# Patient Record
Sex: FEMALE | Race: WHITE | NOT HISPANIC OR LATINO | Employment: OTHER | URBAN - METROPOLITAN AREA
[De-identification: names, ages, dates, MRNs, and addresses within clinical notes are randomized per-mention and may not be internally consistent; named-entity substitution may affect disease eponyms.]

---

## 2017-02-07 ENCOUNTER — OFFICE VISIT - HEALTHEAST (OUTPATIENT)
Dept: INTERNAL MEDICINE | Facility: CLINIC | Age: 27
End: 2017-02-07

## 2017-02-07 DIAGNOSIS — M79.676 PAIN OF FIFTH TOE: ICD-10-CM

## 2017-02-07 ASSESSMENT — MIFFLIN-ST. JEOR: SCORE: 1791.37

## 2017-05-14 ENCOUNTER — TRANSFERRED RECORDS (OUTPATIENT)
Dept: HEALTH INFORMATION MANAGEMENT | Facility: CLINIC | Age: 27
End: 2017-05-14

## 2017-05-15 ENCOUNTER — OFFICE VISIT (OUTPATIENT)
Dept: PODIATRY | Facility: CLINIC | Age: 27
End: 2017-05-15
Payer: COMMERCIAL

## 2017-05-15 DIAGNOSIS — S92.355A CLOSED NONDISPLACED FRACTURE OF FIFTH METATARSAL BONE OF LEFT FOOT, INITIAL ENCOUNTER: Primary | ICD-10-CM

## 2017-05-15 PROCEDURE — 99204 OFFICE O/P NEW MOD 45 MIN: CPT | Performed by: PODIATRIST

## 2017-05-15 NOTE — MR AVS SNAPSHOT
After Visit Summary   5/15/2017    Raquel Van    MRN: [id number]           Patient Information     Date Of Birth          1990        Visit Information        Provider Department      5/15/2017 1:40 PM Carson Peralta DPM [facility]        Today's Diagnoses     Closed nondisplaced fracture of fifth metatarsal bone of left foot, initial encounter    -  1      Care Instructions    Minimal weight bearing as tolerated in the boot with crutches as needed.    PRICE Therapy    Many aches and pains throughout the foot and ankle can be helped with many simple treatments.  This is usually described as PRICE Therapy.      P - Protection - often times, inflammation/pain in the lower extremity is not able to improve simply because the areas involved are never allowed to rest.  Every step we take can bother the problematic area.  Protecting those areas is an important step in the healing process.  This may involve a walking cast boot, a special insert/orthotic device, an ankle brace, or simply avoiding barefoot walking.    R - Rest - in addition to protecting the foot/ankle, resting is an important, but often times difficult, treatment option.  Getting off your feet when they bother you, and specifically avoiding activities that cause pain/discomfort, are very beneficial to prevent, and treat, foot/ankle pain.      I - Ice - icing regularly can help to decrease inflammation and swelling in the foot, thus decreasing pain.  Using an ice pack or a bag of frozen peas works very well.  Ice for 20 minutes multiple times per day as needed.  Do not place the ice directly on the skin as this can cause tissue damage.    C - Compression - using a compression wrap or an ACE wrap can help to decrease swelling, which can help to decrease pain.  Wearing the wraps is generally not needed at night, but they should be worn on a regular basis when you are going to be on your feet for prolonged periods as  gravity tends to pull fluids down to your feet/ankles.    E - Elevation - elevating your lower extremities multiple times daily for 15-20 minutes can help to decrease swelling, which works well in decreasing pain levels.      NSAID/Tylenol - An anti-inflammatory, like Aleve or ibuprofen, and/or a pain medication, such as Tylenol, can help to improve pain levels and get the issue resolved sooner rather than later.  Anyone with liver issues should be careful with Tylenol, and anyone with high blood pressure or heart, stomach or kidney issues should be careful with anti-inflammatories.  Please ask if you have questions about these medications, including dosage.      BLOOD CLOT PREVENTION - WEARING A CAST BOOT    Any brace that extends up to the knee can increase your chance of developing a blood clot. Blood clots generally occur in the large veins of your calf, thigh or abdomen. A blood clot may form when blood is stagnant in the veins while your leg is immobilized in the brace. Ernie and relaxing the calf muscles by moving the ankle is the best method to avoid stagnant blood. Clarify with your doctor if you should be doing this as this may not be recommended for certain tendon surgeries.    Blood clots or deep venous thrombosis (DVT) can be life threatening if a portion of the clot breaks away and travels through the veins to your lungs. This is called a pulmonary embolism (PE) which can result in death.    Symptoms of a DVT may include swelling, tenderness, a warm feeling or redness in the leg.  DVT can occur in both legs, even if only one side is in a brace. If you have these symptoms, you should call your doctor immediately or go to the Emergency Room to have your leg scanned.     Symptoms of a pulmonary embolism (PE) include chest pain, shortness of breath or the need to breath rapidly that is not associated with exercise, difficulty breathing, rapid heart rate, coughing or coughing up blood, or a feeling of  passing out. Chest pain can range from mild to knife-like pain while taking a deep breath. Pulmonary embolism can occur without any symptoms whatsoever. You need to be evaluated in a hospital emergency room immediately if you have symptoms of a PE. Please call 911 as PE is a life-threatening emergency.    Be certain that you understand how much ankle range of motion is allowed. Your foot and ankle problem is being immobilized by the brace, yet we do not want you to develop a blood clot. The velcro strap braces are intended to be removed for periodic exercise of the ankle. Your doctor will tell you if it is okay to do this depending on the type of surgery you had.    Your own personal risk of developing a DVT or PE is dependent on many factors. A personal or family history of previous blood clot or a clotting disorder (such as thrombophilia) puts you at risk. Other risk factors include history of cancer, current use of estrogen medications (such as birth control pills or post menopausal medications), recent trauma or fracture, diabetes, recent heart attack, COPD, heart failure, pregnancy, obesity, advanced age, smoking, etc. Please make sure your doctor is aware if you have any of these risk factors.          Follow-ups after your visit        Who to contact     If you have questions or need follow up information about today's clinic visit or your schedule please contact [facility] directly at [phone].  Normal or non-critical lab and imaging results will be communicated to you by MyChart, letter or phone within 4 business days after the clinic has received the results. If you do not hear from us within 7 days, please contact the clinic through MyChart or phone. If you have a critical or abnormal lab result, we will notify you by phone as soon as possible.  Submit refill requests through [employer] or call your pharmacy and they will forward the refill request to us. Please allow 3 business days  "for your refill to be completed.          Additional Information About Your Visit        [email or web address] Information     [facility] lets you send messages to your doctor, view your test results, renew your prescriptions, schedule appointments and more. To sign up, go to [email or web address] . Click on \"Log in\" on the left side of the screen, which will take you to the Welcome page. Then click on \"Sign up Now\" on the right side of the page.     You will be asked to enter the access code listed below, as well as some personal information. Please follow the directions to create your username and password.     Your access code is: [email or web address]  Expires: 2017  2:12 PM     Your access code will  in 90 days. If you need help or a new code, please call your [place] clinic or [phone].        Care EveryWhere ID     This is your Care EveryWhere ID. This could be used by other organizations to access your [place] medical records  [id number]         Blood Pressure from Last 3 Encounters:   No data found for BP    Weight from Last 3 Encounters:   No data found for Wt              Today, you had the following     No orders found for display       Primary Care Provider    None Specified       No primary provider on file.        Thank you!     Thank you for choosing [facility]  for your care. Our goal is always to provide you with excellent care. Hearing back from our patients is one way we can continue to improve our services. Please take a few minutes to complete the written survey that you may receive in the mail after your visit with us. Thank you!             Your Updated Medication List - Protect others around you: Learn how to safely use, store and throw away your medicines at www.disposemymeds.org.          This list is accurate as of: 5/15/17  2:12 PM.  Always use your most recent med list.                   Brand Name Dispense Instructions for use    IBUPROFEN PO      Take 800 mg by mouth " every 4 hours as needed for moderate pain

## 2017-05-15 NOTE — NURSING NOTE
Chief Complaint   Patient presents with     Fracture     L foot fracture DOI:5/13/17       Initial There were no vitals taken for this visit. There is no height or weight on file to calculate BMI.  Medication Reconciliation: elijah Rivas MA May 15, 2017 2:30 PM

## 2017-05-15 NOTE — PROGRESS NOTES
PATIENT HISTORY:  Raquel Van is a 27 year old female who presents to clinic for a left foot injury.  2 days ago pt fell off a front step.  XRs taken in [place] and brought on disc.  5th met base fx noted, tuberosity type.  6-8/10 pain.  She is icing, elevating.  She has a post op shoe and crutches.    Review of Systems:  Patient denies fever, chills, rash, wound, stiffness, limping, numbness, weakness, heart burn, blood in stool, chest pain with activity, calf pain when walking, shortness of breath with activity, chronic cough, easy bleeding/bruising, swelling of ankles, excessive thirst, fatigue, depression, anxiety.  Patient admits to limping.     PAST MEDICAL HISTORY: Denies     PAST SURGICAL HISTORY: No past surgical history on file.     MEDICATIONS: No current outpatient prescriptions on file.     ALLERGIES:  Allergies not on file     SOCIAL HISTORY:   Social History     Social History     Marital status:      Spouse name: N/A     Number of children: N/A     Years of education: N/A     Occupational History     Not on file.     Social History Main Topics     Smoking status: Not on file     Smokeless tobacco: Not on file     Alcohol use Not on file     Drug use: Not on file     Sexual activity: Not on file     Other Topics Concern     Not on file     Social History Narrative        FAMILY HISTORY: DM     EXAM:    General appearance: Patient is alert and fully cooperative with history & exam.  No sign of distress is noted during the visit.     Psychiatric: Affect is pleasant & appropriate.  Patient appears motivated to improve health.     Respiratory: Breathing is regular & unlabored while sitting.     HEENT: Hearing is intact to spoken word.  Speech is clear.  No gross evidence of visual impairment that would impact ambulation.     Dermatologic: Skin is intact to both lower extremities without significant lesions, rash or abrasion.  No paronychia or evidence of soft tissue infection is noted.     Vascular: DP &  PT pulses are intact & regular bilaterally.  No significant edema or varicosities noted.  CFT and skin temperature are normal to both lower extremities.     Neurologic: Lower extremity sensation is intact to light touch.  No evidence of weakness or contracture in the lower extremities.  No evidence of neuropathy.     Musculoskeletal: Pain to palpation at left 5th metatarsal base.  No other significant foot/ankle pain.  No gross ankle deformity noted.  No foot or ankle joint effusion is noted.     XRs on disc reviewed with pt.  Relatively nondisplaced fx of left 5th metatarsal base/tuberosity.      ASSESSMENT: Left 5th metatarsal fx     PLAN:  Reviewed patient's chart in epic.  Discussed condition and treatment options including pros and cons.    Treatment options for the fracture were discussed.  Non-operative treatment would involve a period of immobilization, rest, bracing or casting and edema control.  There is potential for the fracture to heal without surgery yet there may still be a need for delayed surgery.  There is potential for non-union with any fracture.    A decision was made to pursue nonoperative care based on patient preference, fracture pattern, anticipated stability of the fracture, appearance of the soft tissue.    Short Aircast boot fitted.  RICE advised.  Crutch use prn, but pt may transition to wt bearing as tolerated in the boot.    We discussed immobilization and the risk of blood clot. Leg raises, knee bends, compression recommended. Patient to seek medical attention if calf swelling and/or pain, chest pain, shortness of breath.    F/u in 3 wks.         Carson Peralta DPM, FACFAS

## 2017-05-15 NOTE — PATIENT INSTRUCTIONS
Minimal weight bearing as tolerated in the boot with crutches as needed.    PRICE Therapy    Many aches and pains throughout the foot and ankle can be helped with many simple treatments.  This is usually described as PRICE Therapy.      P - Protection - often times, inflammation/pain in the lower extremity is not able to improve simply because the areas involved are never allowed to rest.  Every step we take can bother the problematic area.  Protecting those areas is an important step in the healing process.  This may involve a walking cast boot, a special insert/orthotic device, an ankle brace, or simply avoiding barefoot walking.    R - Rest - in addition to protecting the foot/ankle, resting is an important, but often times difficult, treatment option.  Getting off your feet when they bother you, and specifically avoiding activities that cause pain/discomfort, are very beneficial to prevent, and treat, foot/ankle pain.      I - Ice - icing regularly can help to decrease inflammation and swelling in the foot, thus decreasing pain.  Using an ice pack or a bag of frozen peas works very well.  Ice for 20 minutes multiple times per day as needed.  Do not place the ice directly on the skin as this can cause tissue damage.    C - Compression - using a compression wrap or an ACE wrap can help to decrease swelling, which can help to decrease pain.  Wearing the wraps is generally not needed at night, but they should be worn on a regular basis when you are going to be on your feet for prolonged periods as gravity tends to pull fluids down to your feet/ankles.    E - Elevation - elevating your lower extremities multiple times daily for 15-20 minutes can help to decrease swelling, which works well in decreasing pain levels.      NSAID/Tylenol - An anti-inflammatory, like Aleve or ibuprofen, and/or a pain medication, such as Tylenol, can help to improve pain levels and get the issue resolved sooner rather than later.  Anyone  with liver issues should be careful with Tylenol, and anyone with high blood pressure or heart, stomach or kidney issues should be careful with anti-inflammatories.  Please ask if you have questions about these medications, including dosage.      BLOOD CLOT PREVENTION - WEARING A CAST BOOT    Any brace that extends up to the knee can increase your chance of developing a blood clot. Blood clots generally occur in the large veins of your calf, thigh or abdomen. A blood clot may form when blood is stagnant in the veins while your leg is immobilized in the brace. Ernie and relaxing the calf muscles by moving the ankle is the best method to avoid stagnant blood. Clarify with your doctor if you should be doing this as this may not be recommended for certain tendon surgeries.    Blood clots or deep venous thrombosis (DVT) can be life threatening if a portion of the clot breaks away and travels through the veins to your lungs. This is called a pulmonary embolism (PE) which can result in death.    Symptoms of a DVT may include swelling, tenderness, a warm feeling or redness in the leg.  DVT can occur in both legs, even if only one side is in a brace. If you have these symptoms, you should call your doctor immediately or go to the Emergency Room to have your leg scanned.     Symptoms of a pulmonary embolism (PE) include chest pain, shortness of breath or the need to breath rapidly that is not associated with exercise, difficulty breathing, rapid heart rate, coughing or coughing up blood, or a feeling of passing out. Chest pain can range from mild to knife-like pain while taking a deep breath. Pulmonary embolism can occur without any symptoms whatsoever. You need to be evaluated in a hospital emergency room immediately if you have symptoms of a PE. Please call 911 as PE is a life-threatening emergency.    Be certain that you understand how much ankle range of motion is allowed. Your foot and ankle problem is being  immobilized by the brace, yet we do not want you to develop a blood clot. The velcro strap braces are intended to be removed for periodic exercise of the ankle. Your doctor will tell you if it is okay to do this depending on the type of surgery you had.    Your own personal risk of developing a DVT or PE is dependent on many factors. A personal or family history of previous blood clot or a clotting disorder (such as thrombophilia) puts you at risk. Other risk factors include history of cancer, current use of estrogen medications (such as birth control pills or post menopausal medications), recent trauma or fracture, diabetes, recent heart attack, COPD, heart failure, pregnancy, obesity, advanced age, smoking, etc. Please make sure your doctor is aware if you have any of these risk factors.

## 2017-05-15 NOTE — PROGRESS NOTES
Unable to do weight plan, pt refused to have weight taken.  SPENSER Rivas MA May 15, 2017 2:31 PM

## 2017-06-05 ENCOUNTER — OFFICE VISIT (OUTPATIENT)
Dept: PODIATRY | Facility: CLINIC | Age: 27
End: 2017-06-05
Payer: COMMERCIAL

## 2017-06-05 ENCOUNTER — RADIANT APPOINTMENT (OUTPATIENT)
Dept: GENERAL RADIOLOGY | Facility: CLINIC | Age: 27
End: 2017-06-05
Attending: PODIATRIST
Payer: COMMERCIAL

## 2017-06-05 VITALS
DIASTOLIC BLOOD PRESSURE: 88 MMHG | BODY MASS INDEX: 35.84 KG/M2 | HEIGHT: 66 IN | SYSTOLIC BLOOD PRESSURE: 123 MMHG | WEIGHT: 223 LBS

## 2017-06-05 DIAGNOSIS — S92.355D CLOSED NONDISPLACED FRACTURE OF FIFTH METATARSAL BONE OF LEFT FOOT WITH ROUTINE HEALING, SUBSEQUENT ENCOUNTER: Primary | ICD-10-CM

## 2017-06-05 PROCEDURE — 99213 OFFICE O/P EST LOW 20 MIN: CPT | Performed by: PODIATRIST

## 2017-06-05 PROCEDURE — 73630 X-RAY EXAM OF FOOT: CPT | Mod: LT

## 2017-06-05 NOTE — PROGRESS NOTES
Weight management plan: Patient was referred to their PCP to discuss a diet and exercise plan.     SPENSER Rivas MA June 5, 2017 1:59 PM

## 2017-06-05 NOTE — MR AVS SNAPSHOT
After Visit Summary   6/5/2017    Raquel Van    MRN: [id number]           Patient Information     Date Of Birth          1990        Visit Information        Provider Department      6/5/2017 1:40 PM Carson Peralta DPM [facility]        Today's Diagnoses     Closed nondisplaced fracture of fifth metatarsal bone of left foot with routine healing, subsequent encounter    -  1      Care Instructions    PRICE THERAPY    Many aches and pains throughout the foot and ankle can be helped with many simple treatments. This is usually described as PRICE Therapy.      P - Protection - often times, inflammation/pain in the lower extremity is not able to improve simply because the areas involved are never allowed to rest. Every step we take can bother the problematic area. Protecting those areas is an important step in the healing process. This may involve a walking cast boot, a special insert/orthotic device, an ankle brace, or simply avoiding barefoot walking.    R - Rest - in addition to protecting the foot/ankle, resting is an important, but often times difficult, treatment option. Getting off your feet when they bother you, and specifically avoiding activities that cause pain/discomfort, are very beneficial to prevent, and treat, foot/ankle pain.      I - Ice - icing regularly can help to decrease inflammation and swelling in the foot, thus decreasing pain. Using an ice pack or a bag of frozen veggies works very well. Ice for 20 minutes multiple times per day as needed.  Do not place the ice directly on the skin as this can cause tissue damage.    C - Compression - using a compression wrap or an ACE wrap can help to decrease swelling, which can help to decrease pain. Wearing the wraps is generally not needed at night, but they should be worn on a regular basis when you are going to be on your feet for prolonged periods as gravity tends to pull fluids down to your feet/ankles.    E -  "Elevation - elevating your lower extremities multiple times daily for 15-20 minutes can help to decrease swelling, which works well in decreasing pain levels.    NSAID/Tylenol - Anti-inflammatories like Aleve or ibuprofen, and/or a pain medication, such as Tylenol, can help to improve pain levels and get the issue resolved sooner rather than later. Anyone with liver issues should be careful with Tylenol, and anyone with high blood pressure or heart, stomach or kidney issues should be careful with anti-inflammatories. Please ask if you have questions about these medications, including dosage.                Follow-ups after your visit        Your next 10 appointments already scheduled     Jun 26, 2017  1:40 PM CDT   Return Visit with Carson Peralta DPM   [facility]    [address] [place] [id number] [phone]              Who to contact     If you have questions or need follow up information about today's clinic visit or your schedule please contact [facility] directly at [phone].  Normal or non-critical lab and imaging results will be communicated to you by [employer], letter or phone within 4 business days after the clinic has received the results. If you do not hear from us within 7 days, please contact the clinic through [employer] or phone. If you have a critical or abnormal lab result, we will notify you by phone as soon as possible.  Submit refill requests through [employer] or call your pharmacy and they will forward the refill request to us. Please allow 3 business days for your refill to be completed.          Additional Information About Your Visit        [employer] Information     [employer] lets you send messages to your doctor, view your test results, renew your prescriptions, schedule appointments and more. To sign up, go to [email or web address] . Click on \"Log in\" on the left side of the screen, which will take you " "to the Welcome page. Then click on \"Sign up Now\" on the right side of the page.     You will be asked to enter the access code listed below, as well as some personal information. Please follow the directions to create your username and password.     Your access code is: [email or web address]  Expires: 2017  2:12 PM     Your access code will  in 90 days. If you need help or a new code, please call your [facility] or [phone].        Care EveryWhere ID     This is your Care EveryWhere ID. This could be used by other organizations to access your [place] medical records  [id number]        Your Vitals Were     Height BMI (Body Mass Index)                5' 6\" (1.676 m) 35.99 kg/m2           Blood Pressure from Last 3 Encounters:   17 123/88    Weight from Last 3 Encounters:   17 223 lb (101.2 kg)              We Performed the Following     XR Foot Left G/E 3 Views        Primary Care Provider    None Specified       No primary provider on file.        Thank you!     Thank you for choosing [facility]  for your care. Our goal is always to provide you with excellent care. Hearing back from our patients is one way we can continue to improve our services. Please take a few minutes to complete the written survey that you may receive in the mail after your visit with us. Thank you!             Your Updated Medication List - Protect others around you: Learn how to safely use, store and throw away your medicines at www.disposemymeds.org.          This list is accurate as of: 17  2:05 PM.  Always use your most recent med list.                   Brand Name Dispense Instructions for use    IBUPROFEN PO      Take 800 mg by mouth every 4 hours as needed for moderate pain       order for DME     1 Device    Short aircast boot size LG         "

## 2017-06-05 NOTE — PATIENT INSTRUCTIONS
PRICE THERAPY    Many aches and pains throughout the foot and ankle can be helped with many simple treatments. This is usually described as PRICE Therapy.      P - Protection - often times, inflammation/pain in the lower extremity is not able to improve simply because the areas involved are never allowed to rest. Every step we take can bother the problematic area. Protecting those areas is an important step in the healing process. This may involve a walking cast boot, a special insert/orthotic device, an ankle brace, or simply avoiding barefoot walking.    R - Rest - in addition to protecting the foot/ankle, resting is an important, but often times difficult, treatment option. Getting off your feet when they bother you, and specifically avoiding activities that cause pain/discomfort, are very beneficial to prevent, and treat, foot/ankle pain.      I - Ice - icing regularly can help to decrease inflammation and swelling in the foot, thus decreasing pain. Using an ice pack or a bag of frozen veggies works very well. Ice for 20 minutes multiple times per day as needed.  Do not place the ice directly on the skin as this can cause tissue damage.    C - Compression - using a compression wrap or an ACE wrap can help to decrease swelling, which can help to decrease pain. Wearing the wraps is generally not needed at night, but they should be worn on a regular basis when you are going to be on your feet for prolonged periods as gravity tends to pull fluids down to your feet/ankles.    E - Elevation - elevating your lower extremities multiple times daily for 15-20 minutes can help to decrease swelling, which works well in decreasing pain levels.    NSAID/Tylenol - Anti-inflammatories like Aleve or ibuprofen, and/or a pain medication, such as Tylenol, can help to improve pain levels and get the issue resolved sooner rather than later. Anyone with liver issues should be careful with Tylenol, and anyone with high blood pressure  or heart, stomach or kidney issues should be careful with anti-inflammatories. Please ask if you have questions about these medications, including dosage.

## 2017-06-05 NOTE — PROGRESS NOTES
"PATIENT HISTORY:  Raquel Van is a 27 year old female who presents to clinic for recheck of left 5th met base fracture.  Occurred about 3 wks ago.  Doing better in her boot.  About 1 wk ago pt did re injure the foot when her cat ran by.  She was not wearing the boot at that time.  Pt is WBAT in the boot and is comfortable overall.  Denies fevers, chills.  Nonsmoker.  Not working.  Nondiabetic.       EXAM:Vitals: /88 (BP Location: Left arm, Patient Position: Chair, Cuff Size: Adult Large)  Ht 5' 6\" (1.676 m)  Wt 223 lb (101.2 kg)  BMI 35.99 kg/m2  BMI= Body mass index is 35.99 kg/(m^2).    General appearance: Patient is alert and fully cooperative with history & exam.  No sign of distress is noted during the visit.     Dermatologic: Skin is intact to the left foot without significant lesions, rash or abrasion.  No paronychia or evidence of soft tissue infection is noted.      Vascular: DP & PT pulses are intact & regular on the left.  Mild left foot edema.  CFT and skin temperature are normal.      Neurologic: Lower extremity sensation is intact to light touch.  No evidence of weakness or contracture in the lower extremities.  No evidence of neuropathy.      Musculoskeletal: Pain to palpation of left 5th metatarsal base.  No gross ankle deformity noted.  No foot or ankle joint effusion is noted.      XRs reviewed with pt.  Fx of left 5th metatarsal base/tuberosity with some increased gapping since prior films, 2-3mm.     ASSESSMENT: Left 5th metatarsal fx      PLAN:  Reviewed patient's chart in epic.  Discussed condition and treatment options including pros and cons.     Treatment options for the fracture were discussed.  Non-operative treatment would involve a period of immobilization, rest, bracing or casting and edema control.  There is potential for the fracture to heal without surgery yet there may still be a need for delayed surgery.  There is potential for non-union with or without surgery and " non-operative care would avoid incision problems, fixation complications and anesthesia risks.  Both operative and non-operative treatment may result in non-anatomic alignment of the fracture however that may be tolerable.  Surgical treatment would include incision, reduction of the fracture and fixation.  Surgery has added risks like infection, incision pain, potentially higher risk of blood clot, neuritis or numbness and eventual need for removal of the internal fixation.      Surgery was offered given some increased displacement, though this may still heal well nonoperatively.     A decision was made to continue nonoperative care based on patient preference.     Continue in boot.  WBAT.  RICE as needed.     F/u in 3 wks.            Carson Peralta DPM, FACFAS

## 2017-06-05 NOTE — NURSING NOTE
"Chief Complaint   Patient presents with     Fracture     3 week follow up L foot Fx       Initial /88 (BP Location: Left arm, Patient Position: Chair, Cuff Size: Adult Large)  Ht 5' 6\" (1.676 m)  Wt 223 lb (101.2 kg)  BMI 35.99 kg/m2 Estimated body mass index is 35.99 kg/(m^2) as calculated from the following:    Height as of this encounter: 5' 6\" (1.676 m).    Weight as of this encounter: 223 lb (101.2 kg).  Medication Reconciliation: gay Rivas MA June 5, 2017 1:59 PM  "

## 2017-06-26 ENCOUNTER — OFFICE VISIT (OUTPATIENT)
Dept: PODIATRY | Facility: CLINIC | Age: 27
End: 2017-06-26
Payer: COMMERCIAL

## 2017-06-26 ENCOUNTER — RADIANT APPOINTMENT (OUTPATIENT)
Dept: GENERAL RADIOLOGY | Facility: CLINIC | Age: 27
End: 2017-06-26
Attending: PODIATRIST
Payer: COMMERCIAL

## 2017-06-26 VITALS
RESPIRATION RATE: 12 BRPM | WEIGHT: 221 LBS | OXYGEN SATURATION: 97 % | DIASTOLIC BLOOD PRESSURE: 76 MMHG | BODY MASS INDEX: 35.67 KG/M2 | SYSTOLIC BLOOD PRESSURE: 111 MMHG | HEART RATE: 68 BPM

## 2017-06-26 DIAGNOSIS — S92.355D CLOSED NONDISPLACED FRACTURE OF FIFTH METATARSAL BONE OF LEFT FOOT WITH ROUTINE HEALING, SUBSEQUENT ENCOUNTER: Primary | ICD-10-CM

## 2017-06-26 PROCEDURE — 73630 X-RAY EXAM OF FOOT: CPT | Mod: LT

## 2017-06-26 PROCEDURE — 99213 OFFICE O/P EST LOW 20 MIN: CPT | Performed by: PODIATRIST

## 2017-06-26 NOTE — NURSING NOTE
"Chief Complaint   Patient presents with     Musculoskeletal Problem     left foot recheck        Initial /76  Pulse 68  Resp 12  Wt 221 lb (100.2 kg)  SpO2 97%  BMI 35.67 kg/m2 Estimated body mass index is 35.67 kg/(m^2) as calculated from the following:    Height as of 6/5/17: 5' 6\" (1.676 m).    Weight as of this encounter: 221 lb (100.2 kg).  Medication Reconciliation: elijah Jaime MA     "

## 2017-06-26 NOTE — MR AVS SNAPSHOT
After Visit Summary   6/26/2017    Raquel Van    MRN: [id number]           Patient Information     Date Of Birth          1990        Visit Information        Provider Department      6/26/2017 1:40 PM Carson Peralta DPM [facility]        Today's Diagnoses     Closed nondisplaced fracture of fifth metatarsal bone of left foot with routine healing, subsequent encounter    -  1      Care Instructions    Continue in the CAM boot.  Follow up in 2 weeks.        Body Mass Index (BMI)  Many things can cause foot and ankle problems. Foot structure, activity level, foot mechanics and injuries are common causes of pain.  One very important issue that often goes unmentioned is body weight. Extra weight can cause increased stress on muscles, ligaments, bones and tendons. Sometimes just a few extra pounds is all it takes to put one over her/his threshold. Without reducing that stress, it can be difficult to alleviate pain.   Some people are uncomfortable addressing this issue, but we feel it is important for you to think about it. As Foot & Ankle specialists, our job is addressing the lower extremity problem and possible causes.   Regarding extra body weight, we encourage patients to discuss diet and weight management plans with their primary care doctors. It is this team approach that gives you the best opportunity for pain relief and getting you back on your feet.             Follow-ups after your visit        Follow-up notes from your care team     Return if symptoms worsen or fail to improve.      Who to contact     If you have questions or need follow up information about today's clinic visit or your schedule please contact [facility] directly at [phone].  Normal or non-critical lab and imaging results will be communicated to you by MyChart, letter or phone within 4 business days after the clinic has received the results. If you do not hear from us within 7  "days, please contact the clinic through Listen Edition or phone. If you have a critical or abnormal lab result, we will notify you by phone as soon as possible.  Submit refill requests through Listen Edition or call your pharmacy and they will forward the refill request to us. Please allow 3 business days for your refill to be completed.          Additional Information About Your Visit        [email or web address] Information     Listen Edition lets you send messages to your doctor, view your test results, renew your prescriptions, schedule appointments and more. To sign up, go to [email or web address] . Click on \"Log in\" on the left side of the screen, which will take you to the Welcome page. Then click on \"Sign up Now\" on the right side of the page.     You will be asked to enter the access code listed below, as well as some personal information. Please follow the directions to create your username and password.     Your access code is: [email or web address]  Expires: 2017  2:12 PM     Your access code will  in 90 days. If you need help or a new code, please call your [facility] clinic or [phone].        Care EveryWhere ID     This is your Care EveryWhere ID. This could be used by other organizations to access your [facility] medical records  [id number]        Your Vitals Were     Pulse Respirations Pulse Oximetry BMI (Body Mass Index)          68 12 97% 35.67 kg/m2         Blood Pressure from Last 3 Encounters:   17 111/76   17 123/88    Weight from Last 3 Encounters:   17 221 lb (100.2 kg)   17 223 lb (101.2 kg)              We Performed the Following     XR Foot Left G/E 3 Views        Primary Care Provider    None Specified       No primary provider on file.        Equal Access to Services     [facility]: Latoya Gant, ludwin siddiqi, sally basilio. So [facility] [phone].    ATENCIÓN: Si habla español, tiene a lawrence disposición servicios " zachery de asistencia lingüística. Lory cifuentes [phone].    We comply with applicable federal civil rights laws and [place] laws. We do not discriminate on the basis of race, color, national origin, age, disability sex, sexual orientation or gender identity.            Thank you!     Thank you for choosing [facility]  for your care. Our goal is always to provide you with excellent care. Hearing back from our patients is one way we can continue to improve our services. Please take a few minutes to complete the written survey that you may receive in the mail after your visit with us. Thank you!             Your Updated Medication List - Protect others around you: Learn how to safely use, store and throw away your medicines at www.disposemymeds.org.          This list is accurate as of: 6/26/17  2:02 PM.  Always use your most recent med list.                   Brand Name Dispense Instructions for use Diagnosis    IBUPROFEN PO      Take 800 mg by mouth every 4 hours as needed for moderate pain        order for DME     1 Device    Short aircast boot size LG    Closed nondisplaced fracture of fifth metatarsal bone of left foot, initial encounter

## 2017-06-26 NOTE — PATIENT INSTRUCTIONS
Continue in the CAM boot.  Follow up in 2 weeks.        Body Mass Index (BMI)  Many things can cause foot and ankle problems. Foot structure, activity level, foot mechanics and injuries are common causes of pain.  One very important issue that often goes unmentioned is body weight. Extra weight can cause increased stress on muscles, ligaments, bones and tendons. Sometimes just a few extra pounds is all it takes to put one over her/his threshold. Without reducing that stress, it can be difficult to alleviate pain.   Some people are uncomfortable addressing this issue, but we feel it is important for you to think about it. As Foot & Ankle specialists, our job is addressing the lower extremity problem and possible causes.   Regarding extra body weight, we encourage patients to discuss diet and weight management plans with their primary care doctors. It is this team approach that gives you the best opportunity for pain relief and getting you back on your feet.

## 2017-06-26 NOTE — PROGRESS NOTES
PATIENT HISTORY:  Raquel Van is a 27 year old female who presents to clinic for recheck of left 5th met base fracture.  Occurred about 6 wks ago.  Denies pain in the boot.  Denies fevers, chills.  Nonsmoker.  Not working.  Nondiabetic.       EXAM:Vitals: /76  Pulse 68  Resp 12  Wt 221 lb (100.2 kg)  SpO2 97%  BMI 35.67 kg/m2  BMI= Body mass index is 35.67 kg/(m^2).    General appearance: Patient is alert and fully cooperative with history & exam.  No sign of distress is noted during the visit.     Dermatologic: Skin is intact to the left foot without significant lesions, rash or abrasion.  No paronychia or evidence of soft tissue infection is noted.      Vascular: DP & PT pulses are intact & regular on the left.  Mild left foot edema.  CFT and skin temperature are normal.      Neurologic: Lower extremity sensation is intact to light touch.  No evidence of weakness or contracture in the lower extremities.  No evidence of neuropathy.      Musculoskeletal: Mild pain to palpation of left 5th metatarsal base.  No gross ankle deformity noted.  No foot or ankle joint effusion is noted.      XRs reviewed with pt.  Fx of left 5th metatarsal base/tuberosity.  Mild gapping, but overall stable position.  Some interval healing.      ASSESSMENT: Left 5th metatarsal fx      PLAN:  Reviewed patient's chart in epic.  Discussed condition and treatment options including pros and cons.     Maintain boot use for now.  WBAT.  RICE as needed.    F/u 2 wks.       Carson Peralta DPM, FACFAS    Weight management plan: Patient was referred to their PCP to discuss a diet and exercise plan.

## 2017-07-10 ENCOUNTER — OFFICE VISIT (OUTPATIENT)
Dept: PODIATRY | Facility: CLINIC | Age: 27
End: 2017-07-10
Payer: COMMERCIAL

## 2017-07-10 ENCOUNTER — RADIANT APPOINTMENT (OUTPATIENT)
Dept: GENERAL RADIOLOGY | Facility: CLINIC | Age: 27
End: 2017-07-10
Attending: PODIATRIST
Payer: COMMERCIAL

## 2017-07-10 VITALS
WEIGHT: 221 LBS | BODY MASS INDEX: 35.52 KG/M2 | SYSTOLIC BLOOD PRESSURE: 140 MMHG | HEIGHT: 66 IN | DIASTOLIC BLOOD PRESSURE: 80 MMHG

## 2017-07-10 DIAGNOSIS — S92.355D CLOSED NONDISPLACED FRACTURE OF FIFTH METATARSAL BONE OF LEFT FOOT WITH ROUTINE HEALING, SUBSEQUENT ENCOUNTER: Primary | ICD-10-CM

## 2017-07-10 PROCEDURE — 73630 X-RAY EXAM OF FOOT: CPT | Mod: LT

## 2017-07-10 PROCEDURE — 99213 OFFICE O/P EST LOW 20 MIN: CPT | Performed by: PODIATRIST

## 2017-07-10 RX ORDER — FLUTICASONE PROPIONATE 50 MCG
1 SPRAY, SUSPENSION (ML) NASAL
COMMUNITY
Start: 2016-10-13 | End: 2017-07-10

## 2017-07-10 NOTE — PATIENT INSTRUCTIONS
Over the Counter Inserts    Super Feet are the most common and easiest to find.    Locations include any [employer], [employer] in [place] on [address] and in [place] on [address], [employer] Room in [place] on [address], [facility] Running Room in [place] on [address], [employer] in [place] on [address] and [employer] Sport Shop in [place] on [place] and in [place] on [address].    Spenco can be found online and at [employer] in [place] on [address], [employer]' [employer] in [facility] on [address], Gear Running Store in [place] on [address], [employer] in [place] on [address] and [employer] in [place] on [address].    Power Step can be a little harder to find.  Locations include [employer] in [place] on [address], [place] in [place], [employer]over Store in [place] on [address] and online    Walk-Fit - Target     [facility]    **  A good high quality over the counter insert can cost around $40-$50.

## 2017-07-10 NOTE — PROGRESS NOTES
"PATIENT HISTORY:  Raquel Van is a 27 year old female who presents to clinic for recheck of left 5th met base fracture.  Occurred about 8 wks ago.  Denies pain in the boot.  Doing better.  Denies fevers, chills.  Nonsmoker.  Not working.  Nondiabetic.       EXAM:  /80 (BP Location: Left arm, Patient Position: Chair, Cuff Size: Adult Large)  Ht 5' 6\" (1.676 m)  Wt 221 lb (100.2 kg)  BMI 35.67 kg/m2  General appearance: Patient is alert and fully cooperative with history & exam.  No sign of distress is noted during the visit.     Dermatologic: Skin is intact to the left foot without significant lesions, rash or abrasion.  No paronychia or evidence of soft tissue infection is noted.      Vascular: DP & PT pulses are intact & regular on the left.  Mild left foot edema.  CFT and skin temperature are normal.      Neurologic: Lower extremity sensation is intact to light touch.  No evidence of weakness or contracture in the lower extremities.  No evidence of neuropathy.      Musculoskeletal: Very minimal to no pain to palpation of left 5th metatarsal base.  No gross ankle deformity noted.  No foot or ankle joint effusion is noted.      XRs reviewed with pt.  Fx of left 5th metatarsal base/tuberosity.  Some gapping remains.  Some increased bone density noted at fx site.     ASSESSMENT: Left 5th metatarsal fx      PLAN:  Reviewed patient's chart in epic.  Discussed condition and treatment options including pros and cons.     Transition to stiff soled regular shoes as tolerated.  Gradual return to activity.  RICE as needed.  Discussed risk of nonunion, possible future surgical repair if pain continues/worsens.  F/u prn.    Carson Peralta DPM, FACFAS    Weight management plan: Patient was referred to their PCP to discuss a diet and exercise plan.        "

## 2017-07-10 NOTE — PROGRESS NOTES
Weight management plan: Patient was referred to their PCP to discuss a diet and exercise plan.     SPENSER Rivas MA July 10, 2017 1:25 PM

## 2017-07-10 NOTE — MR AVS SNAPSHOT
After Visit Summary   7/10/2017    Raquel Van    MRN: [id number]           Patient Information     Date Of Birth          1990        Visit Information        Provider Department      7/10/2017 1:20 PM Carson Peralta DPM [facility]        Today's Diagnoses     Closed nondisplaced fracture of fifth metatarsal bone of left foot with routine healing, subsequent encounter    -  1      Care Instructions    Over the Counter Inserts    Super Feet are the most common and easiest to find.    Locations include any [employer] Store, [employer] in [place] on [address]  and in [place] on [address], [employer] Running Room in [facility] on [address], [facility] Running Room in [place] on [address], Recreational Equipment [employer] in [place] on [address] and [employer] Sport Shop in [facility] on [address] and in [place] on [address].    Spenco can be found online and at [employer] Shoe Shop in [facility] on [address], [employer]' Fun in [place] on [place], Gear Running Store in [place] on [address], [employer] in [place] on [address] and [employer] in [place] on [address].    Power Step can be a little harder to find.  Locations include [employer] in [place] on [place], [place] in [facility], Stop-over Store in [place] on [address] and online    Walk-Fit - Target     [facility]    **  A good high quality over the counter insert can cost around $40-$50.            Follow-ups after your visit        Who to contact     If you have questions or need follow up information about today's clinic visit or your schedule please contact [facility] directly at [phone].  Normal or non-critical lab and imaging results will be communicated to you by MyChart, letter or phone within 4 business days after the clinic has received the results. If you do not hear from us within 7 days, please contact the  "clinic through GMIhart or phone. If you have a critical or abnormal lab result, we will notify you by phone as soon as possible.  Submit refill requests through ThirdPresence or call your pharmacy and they will forward the refill request to us. Please allow 3 business days for your refill to be completed.          Additional Information About Your Visit        GMIhart Information     ThirdPresence gives you secure access to your electronic health record. If you see a primary care provider, you can also send messages to your care team and make appointments. If you have questions, please call your primary care clinic.  If you do not have a primary care provider, please call [phone] and they will assist you.        Care EveryWhere ID     This is your Care EveryWhere ID. This could be used by other organizations to access your [facility] medical records  [id number]        Your Vitals Were     Height BMI (Body Mass Index)                5' 6\" (1.676 m) 35.67 kg/m2           Blood Pressure from Last 3 Encounters:   07/10/17 140/80   06/26/17 111/76   06/05/17 123/88    Weight from Last 3 Encounters:   07/10/17 221 lb (100.2 kg)   06/26/17 221 lb (100.2 kg)   06/05/17 223 lb (101.2 kg)              We Performed the Following     XR Foot Left G/E 3 Views        Primary Care Provider    None Specified       No primary provider on file.        Equal Access to Services     ALVAREZ GRAHAM : Hadii rylie ku hadasho Soomaali, waaxda luqadaha, qaybta kaalmada adeандрейda, sally tang. So [facility] [phone].    ATENCIÓN: Si habla español, tiene a lawrence disposición servicios gratuitos de asistencia lingüística. Lory al [phone].    We comply with applicable federal civil rights laws and [place] laws. We do not discriminate on the basis of race, color, national origin, age, disability sex, sexual orientation or gender identity.            Thank you!     Thank you for choosing [facility]  for " your care. Our goal is always to provide you with excellent care. Hearing back from our patients is one way we can continue to improve our services. Please take a few minutes to complete the written survey that you may receive in the mail after your visit with us. Thank you!             Your Updated Medication List - Protect others around you: Learn how to safely use, store and throw away your medicines at www.disposemymeds.org.          This list is accurate as of: 7/10/17  1:29 PM.  Always use your most recent med list.                   Brand Name Dispense Instructions for use Diagnosis    IBUPROFEN PO      Take 800 mg by mouth every 4 hours as needed for moderate pain        Levonorgestrel 13.5 MG Iud           order for DME     1 Device    Short aircast boot size LG    Closed nondisplaced fracture of fifth metatarsal bone of left foot, initial encounter

## 2017-08-04 ENCOUNTER — OFFICE VISIT (OUTPATIENT)
Dept: FAMILY MEDICINE | Facility: CLINIC | Age: 27
End: 2017-08-04
Payer: COMMERCIAL

## 2017-08-04 ENCOUNTER — NURSE TRIAGE (OUTPATIENT)
Dept: NURSING | Facility: CLINIC | Age: 27
End: 2017-08-04

## 2017-08-04 VITALS
BODY MASS INDEX: 34.69 KG/M2 | HEART RATE: 69 BPM | DIASTOLIC BLOOD PRESSURE: 80 MMHG | OXYGEN SATURATION: 100 % | TEMPERATURE: 97.8 F | SYSTOLIC BLOOD PRESSURE: 120 MMHG | WEIGHT: 221 LBS | HEIGHT: 67 IN

## 2017-08-04 DIAGNOSIS — R10.11 ABDOMINAL PAIN, RIGHT UPPER QUADRANT: ICD-10-CM

## 2017-08-04 DIAGNOSIS — R10.31 ABDOMINAL PAIN, RIGHT LOWER QUADRANT: Primary | ICD-10-CM

## 2017-08-04 PROCEDURE — 99203 OFFICE O/P NEW LOW 30 MIN: CPT | Performed by: FAMILY MEDICINE

## 2017-08-04 ASSESSMENT — PAIN SCALES - GENERAL: PAINLEVEL: MODERATE PAIN (5)

## 2017-08-04 NOTE — TELEPHONE ENCOUNTER
Patient calling reporting intermittent abdominal pain occurring 2-3 times per month. Pain in lower abdominal area with upper abdominal bloating. Patient does not feel it is related to any specific food that she is eating.  Current episode starting in past 2 hours at 730 a.m. Waking from sleep. Pain has improved over past hour. Nausea. Afebrile.      Mariola Graff RN  [facility]

## 2017-08-04 NOTE — TELEPHONE ENCOUNTER
"  Reason for Disposition    [1] MILD-MODERATE pain AND [2] constant AND [3] present > 2 hours    Additional Information    Negative: Shock suspected (e.g., cold/pale/clammy skin, too weak to stand, low BP, rapid pulse)    Negative: Difficult to awaken or acting confused  (e.g., disoriented, slurred speech)    Negative: Passed out (i.e., lost consciousness, collapsed and was not responding)    Negative: Sounds like a life-threatening emergency to the triager    Negative: Chest pain    Negative: Pain is mainly in upper abdomen  (if needed ask: \"is it mainly above the belly button?\")    Negative: Followed an abdomen (stomach) injury    Negative: [1] Abdominal pain AND [2] pregnant < 20 weeks    Negative: [1] Abdominal pain AND [2] pregnant > 20 weeks    Negative: [1] Abdominal pain AND [2] postpartum < 1 month since delivery    Negative: [1] SEVERE pain (e.g., excruciating) AND [2] present > 1 hour    Negative: [1] SEVERE pain AND [2] age > 60    Negative: [1] Vomiting AND [2] contains red blood or black (\"coffee ground\") material  (Exception: few red streaks in vomit that only happened once)    Negative: Blood in bowel movements   (Exception: blood on surface of BM with constipation)    Negative: Black or tarry bowel movements  (Exception: chronic-unchanged  black-grey bowel movements AND is taking iron pills or Pepto-bismol)    Negative: Patient sounds very sick or weak to the triager    Protocols used: ABDOMINAL PAIN - FEMALE-ADULT-    "

## 2017-08-04 NOTE — PROGRESS NOTES
SUBJECTIVE:                                                    Raquel Van is a 27 year old female who presents to clinic today for the following health issues:    Abdominal Pain      Duration: 8/4/17 7:30 AM    Description (location/character/radiation): Feels very painful like bad gas pains, some episodes of constipation, nausea.       Associated flank pain: Both sides    Intensity:  5/10    Accompanying signs and symptoms:        Fever/Chills: some chills       Gas/Bloating: YES       Nausea/vomitting: YES       Diarrhea: YES       Dysuria or Hematuria: no     History (previous similar pain/trauma/previous testing): Stated this happens once a month    Precipitating or alleviating factors:       Pain worse with eating/BM/urination: None       Pain relieved by BM: no     Therapies tried and outcome: Gas relief tablets    LMP:  HAS IUD.    Stated she noticed some spotting today and the day before.    Lorelei Gee MA      She is a new patient to me and our clinic. She does not have a regular primary care provider.  She had an IUD placed a couple of years ago. She usually gets some light spotting for a day or 2 every month or so, and she is having that now. It is on schedule.  She is  and sexually active.  For a number of months now she's been having some intermittent abdominal discomfort. It usually occurs about once a month. It's worse in the morning and gets better as the day goes on. This morning it started bothering her about 7:30 in the morning. She had not had any breakfast yet. It has eased up since then. Her bowel movements have been normal recently. The discomfort is mainly on the right side of her abdomen.  No known fever. No UTI symptoms. She did not have breakfast this morning because of the discomfort and nausea.    Problem list and histories reviewed & adjusted, as indicated.  Additional history: as documented    There is no problem list on file for this patient.    History reviewed. No  "pertinent surgical history.    Social History   Substance Use Topics     Smoking status: Never Smoker     Smokeless tobacco: Not on file     Alcohol use No     History reviewed. No pertinent family history.      Current Outpatient Prescriptions   Medication Sig Dispense Refill     Levonorgestrel 13.5 MG IUD        IBUPROFEN PO Take 800 mg by mouth every 4 hours as needed for moderate pain       order for DME Short aircast boot size LG (Patient not taking: Reported on 8/4/2017) 1 Device 0     Allergies   Allergen Reactions     Azithromycin Hives     Penicillins Hives         Reviewed and updated as needed this visit by clinical staffAllergies  Meds  Med Hx  Surg Hx  Fam Hx  Soc Hx      Reviewed and updated as needed this visit by Provider         ROS:  Noncontributory except as above    OBJECTIVE:     /80 (BP Location: Left arm, Patient Position: Chair, Cuff Size: Adult Regular)  Pulse 69  Temp 97.8  F (36.6  C) (Oral)  Ht 5' 7\" (1.702 m)  Wt 221 lb (100.2 kg)  SpO2 100%  BMI 34.61 kg/m2  Body mass index is 34.61 kg/(m^2).  GENERAL: alert, no distress and obese  ABDOMEN: Soft with mild right upper and right lower quadrant tenderness to palpation. No significant tenderness to palpation on the left side of the abdomen. No guarding or mass or rebound.    Diagnostic Test Results:  none     ASSESSMENT/PLAN:       ICD-10-CM    1. Abdominal pain, right lower quadrant R10.31 US Abd Cmpl Abd/Pelvis Duplex Cmpl   2. Abdominal pain, right upper quadrant R10.11 US Abd Cmpl Abd/Pelvis Duplex Cmpl     I'm suspicious for gallbladder etiology here, specifically gallstones  Her discomfort is already easing up from this morning  This doesn't sound like ulcer disease, diverticulitis, or specific urinary or GYN etiology  We will obtain an abdominal/pelvic ultrasound with special attention to ruling out gallstones  I will inform her of those results when available and further plan  Bastian diet in the meantime    Brijesh BONILLA" MD Justin  [facility]

## 2017-08-04 NOTE — NURSING NOTE
"Chief Complaint   Patient presents with     Abdominal Pain       Initial /80 (BP Location: Left arm, Patient Position: Chair, Cuff Size: Adult Regular)  Pulse 69  Temp 97.8  F (36.6  C) (Oral)  Ht 5' 7\" (1.702 m)  Wt 221 lb (100.2 kg)  SpO2 100%  BMI 34.61 kg/m2 Estimated body mass index is 34.61 kg/(m^2) as calculated from the following:    Height as of this encounter: 5' 7\" (1.702 m).    Weight as of this encounter: 221 lb (100.2 kg).  Medication Reconciliation: complete   Lorelei Gee MA      "

## 2017-08-04 NOTE — MR AVS SNAPSHOT
After Visit Summary   8/4/2017    Raquel Van    MRN: [id number]           Patient Information     Date Of Birth          1990        Visit Information        Provider Department      8/4/2017 10:40 AM Brijesh Anderson MD [facility] [place] [facility]        Today's Diagnoses     Abdominal pain, right lower quadrant    -  1    Abdominal pain, right upper quadrant           Follow-ups after your visit        Follow-up notes from your care team     Return if symptoms worsen or fail to improve.      Your next 10 appointments already scheduled     Aug 04, 2017  1:00 PM CDT   US ABDOMEN/PELVIS DUPLEX COMPLETE with UCUS3   [facility] ([facility])    [address] [address] [phone]           Please bring a list of your medicines (including vitamins, minerals and over-the-counter drugs). Also, tell your doctor about any allergies you may have. Wear comfortable clothes and leave your valuables at home.  Adults: No eating or drinking for 8 hours before the exam. You may take medicine with a small sip of water.  Children: - Children 6+ years: No food or drink for 6 hours before exam. - Children 1-5 years: No food or drink for 4 hours before exam. - Infants, breast-fed: may have breast milk up to 2 hours before exam. - Infants, formula: may have bottle until 4 hours before exam.  Please call the Imaging Department at your exam site with any questions.              Future tests that were ordered for you today     Open Future Orders        Priority Expected Expires Ordered    US Abd Cmpl Abd/Pelvis Duplex Cmpl Routine  8/4/2018 8/4/2017            Who to contact     If you have questions or need follow up information about today's clinic visit or your schedule please contact [facility] directly at [phone].  Normal or non-critical lab and imaging results will be communicated to you by MyChart, letter or  "phone within 4 business days after the clinic has received the results. If you do not hear from us within 7 days, please contact the clinic through Ecrio or phone. If you have a critical or abnormal lab result, we will notify you by phone as soon as possible.  Submit refill requests through Ecrio or call your pharmacy and they will forward the refill request to us. Please allow 3 business days for your refill to be completed.          Additional Information About Your Visit        BocadaharMediaSpike Information     Ecrio gives you secure access to your electronic health record. If you see a primary care provider, you can also send messages to your care team and make appointments. If you have questions, please call your primary care clinic.  If you do not have a primary care provider, please call [phone] and they will assist you.        Care EveryWhere ID     This is your Care EveryWhere ID. This could be used by other organizations to access your [facility] medical records  [id number]        Your Vitals Were     Pulse Temperature Height Pulse Oximetry BMI (Body Mass Index)       69 97.8  F (36.6  C) (Oral) 5' 7\" (1.702 m) 100% 34.61 kg/m2        Blood Pressure from Last 3 Encounters:   08/04/17 120/80   07/10/17 140/80   06/26/17 111/76    Weight from Last 3 Encounters:   08/04/17 221 lb (100.2 kg)   07/10/17 221 lb (100.2 kg)   06/26/17 221 lb (100.2 kg)               Primary Care Provider    None Specified       No primary provider on file.        Equal Access to Services     ALVAREZ GRAHAM : Hadii rylie hodgeo Sopérez, waaxda luqadaha, qaybta kaalmada sally polanco . So [facility] [phone].    ATENCIÓN: Si habla español, tiene a lawrence disposición servicios gratuitos de asistencia lingüística. Llame al [phone].    We comply with applicable federal civil rights laws and [place] laws. We do not discriminate on the basis of race, color, national origin, age, disability sex, " sexual orientation or gender identity.            Thank you!     Thank you for choosing [facility]  for your care. Our goal is always to provide you with excellent care. Hearing back from our patients is one way we can continue to improve our services. Please take a few minutes to complete the written survey that you may receive in the mail after your visit with us. Thank you!             Your Updated Medication List - Protect others around you: Learn how to safely use, store and throw away your medicines at www.disposemymeds.org.          This list is accurate as of: 8/4/17 11:29 AM.  Always use your most recent med list.                   Brand Name Dispense Instructions for use Diagnosis    IBUPROFEN PO      Take 800 mg by mouth every 4 hours as needed for moderate pain        Levonorgestrel 13.5 MG Iud           order for DME     1 Device    Short aircast boot size LG    Closed nondisplaced fracture of fifth metatarsal bone of left foot, initial encounter

## 2017-09-11 ENCOUNTER — TELEPHONE (OUTPATIENT)
Dept: FAMILY MEDICINE | Facility: CLINIC | Age: 27
End: 2017-09-11

## 2017-09-11 NOTE — TELEPHONE ENCOUNTER
Panel Management Review      Patient has the following on her problem list: None      Composite cancer screening  Chart review shows that this patient is due/due soon for the following Pap Smear  Summary:    Patient is due/failing the following:   PAP and PHYSICAL    Action needed:   Patient needs office visit for physical, pap.    Type of outreach:    Sent [employer] message.    Questions for provider review:    None                                                                                                                                    Christiana Rayo [facility]        Chart routed to Care Team .

## 2017-09-18 NOTE — TELEPHONE ENCOUNTER
Called patient and left a message to return call and schedule an appointment for health maintenance items that are due.  Christiana Rayo CMA

## 2017-09-25 NOTE — TELEPHONE ENCOUNTER
Spoke with Raquel, she normally goes to the [facility]. Informed her that she is due for her pap. She will call and schedule at [place].  Christiana Rayo CMA

## 2017-11-27 ENCOUNTER — OFFICE VISIT (OUTPATIENT)
Dept: FAMILY MEDICINE | Facility: CLINIC | Age: 27
End: 2017-11-27
Payer: COMMERCIAL

## 2017-11-27 VITALS
SYSTOLIC BLOOD PRESSURE: 117 MMHG | DIASTOLIC BLOOD PRESSURE: 80 MMHG | HEART RATE: 90 BPM | TEMPERATURE: 99.1 F | RESPIRATION RATE: 18 BRPM | OXYGEN SATURATION: 96 %

## 2017-11-27 DIAGNOSIS — J06.9 UPPER RESPIRATORY TRACT INFECTION, UNSPECIFIED TYPE: Primary | ICD-10-CM

## 2017-11-27 PROCEDURE — 99213 OFFICE O/P EST LOW 20 MIN: CPT | Performed by: NURSE PRACTITIONER

## 2017-11-27 NOTE — MR AVS SNAPSHOT
After Visit Summary   11/27/2017    Raquel Van    MRN: [id number]           Patient Information     Date Of Birth          1990        Visit Information        Provider Department      11/27/2017 8:15 AM Bernarda Nunez NP [facility]        Today's Diagnoses     Upper respiratory tract infection, unspecified type    -  1       Follow-ups after your visit        Who to contact     If you have questions or need follow up information about today's clinic visit or your schedule please contact [facility] directly at [phone].  Normal or non-critical lab and imaging results will be communicated to you by KuponGidhart, letter or phone within 4 business days after the clinic has received the results. If you do not hear from us within 7 days, please contact the clinic through Carebaset or phone. If you have a critical or abnormal lab result, we will notify you by phone as soon as possible.  Submit refill requests through Kudoala or call your pharmacy and they will forward the refill request to us. Please allow 3 business days for your refill to be completed.          Additional Information About Your Visit        MyChart Information     Kudoala gives you secure access to your electronic health record. If you see a primary care provider, you can also send messages to your care team and make appointments. If you have questions, please call your primary care clinic.  If you do not have a primary care provider, please call [phone] and they will assist you.        Care EveryWhere ID     This is your Care EveryWhere ID. This could be used by other organizations to access your [place] medical records  [id number]        Your Vitals Were     Pulse Temperature Respirations Pulse Oximetry          90 99.1  F (37.3  C) (Oral) 18 96%         Blood Pressure from Last 3 Encounters:   11/27/17 117/80   08/04/17 120/80   07/10/17 140/80    Weight from Last 3 Encounters:    08/04/17 221 lb (100.2 kg)   07/10/17 221 lb (100.2 kg)   06/26/17 221 lb (100.2 kg)              Today, you had the following     No orders found for display       Primary Care Provider Office Phone # Fax #    Bernarda Nunez, [phone]       [address] [facility] [address]        Equal Access to Services     ALVAREZ GRAHAM : Hadii aad ku hadasho Soomaali, waaxda luqadaha, qaybta kaalmada adeegyada, waxay idiin hayaan adeeg kharash lacelio . So [facility] [phone].    ATENCIÓN: Si habla español, tiene a lawrence disposición servicios gratuitos de asistencia lingüística. Farrukhame al [phone].    We comply with applicable federal civil rights laws and [place] laws. We do not discriminate on the basis of race, color, national origin, age, disability, sex, sexual orientation, or gender identity.            Thank you!     Thank you for choosing [facility]  for your care. Our goal is always to provide you with excellent care. Hearing back from our patients is one way we can continue to improve our services. Please take a few minutes to complete the written survey that you may receive in the mail after your visit with us. Thank you!             Your Updated Medication List - Protect others around you: Learn how to safely use, store and throw away your medicines at www.disposemymeds.org.          This list is accurate as of: 11/27/17  8:48 AM.  Always use your most recent med list.                   Brand Name Dispense Instructions for use Diagnosis    Levonorgestrel 13.5 MG Iud

## 2017-11-27 NOTE — PROGRESS NOTES
SUBJECTIVE:   Raquel Van is a 27 year old female who presents to clinic today for the following health issues:      RESPIRATORY SYMPTOMS      Duration: 3 days    Description  ear pain mostly right ear, sore throat, nasal congestion    Severity: moderate    Accompanying signs and symptoms: No fever    History (predisposing factors): none    Precipitating or alleviating factors: None    Therapies tried and outcome:  Dayquil and Nyquil has not helped             Problem list and histories reviewed & adjusted, as indicated.  Additional history: as documented        Reviewed and updated as needed this visit by clinical staff     Reviewed and updated as needed this visit by Provider           OBJECTIVE:     /80  Pulse 90  Temp 99.1  F (37.3  C) (Oral)  Resp 18  SpO2 96%  There is no height or weight on file to calculate BMI.  GENERAL: healthy, alert and no distress  EYES: Eyes grossly normal to inspection, PERRL and conjunctivae and sclerae normal  HENT: normal cephalic/atraumatic, right ear: clear effusion, left ear: normal: no effusions, no erythema, normal landmarks, nose and mouth without ulcers or lesions, oropharynx clear and oral mucous membranes moist  NECK: no adenopathy, no asymmetry, masses, or scars and thyroid normal to palpation  RESP: lungs clear to auscultation - no rales, rhonchi or wheezes  CV: regular rate and rhythm, normal S1 S2, no S3 or S4, no murmur, click or rub, no peripheral edema and peripheral pulses strong  SKIN: no suspicious lesions or rashes        ASSESSMENT/PLAN:             1. Upper respiratory tract infection, unspecified type  Discussed symptomatic treatment measures.  Follow up in one week if no improvement or sooner if symptoms worsen.           Bernarda Nunez NP  [facility]

## 2018-01-21 ENCOUNTER — HEALTH MAINTENANCE LETTER (OUTPATIENT)
Age: 28
End: 2018-01-21

## 2018-07-20 ENCOUNTER — TELEPHONE (OUTPATIENT)
Dept: FAMILY MEDICINE | Facility: CLINIC | Age: 28
End: 2018-07-20

## 2018-07-20 NOTE — TELEPHONE ENCOUNTER
The Patient declined Preventive Health Screens for: CERVICAL  Please review chart and follow-up with patient if needed.    Thank you

## 2019-03-26 ENCOUNTER — DOCUMENTATION ONLY (OUTPATIENT)
Dept: FAMILY MEDICINE | Facility: CLINIC | Age: 29
End: 2019-03-26

## 2019-03-26 ENCOUNTER — OFFICE VISIT (OUTPATIENT)
Dept: FAMILY MEDICINE | Facility: CLINIC | Age: 29
End: 2019-03-26
Payer: COMMERCIAL

## 2019-03-26 VITALS
WEIGHT: 208.25 LBS | HEART RATE: 95 BPM | BODY MASS INDEX: 33.47 KG/M2 | RESPIRATION RATE: 18 BRPM | OXYGEN SATURATION: 99 % | TEMPERATURE: 98.2 F | HEIGHT: 66 IN | SYSTOLIC BLOOD PRESSURE: 124 MMHG | DIASTOLIC BLOOD PRESSURE: 82 MMHG

## 2019-03-26 DIAGNOSIS — Z23 NEEDS FLU SHOT: ICD-10-CM

## 2019-03-26 DIAGNOSIS — Z23 NEED FOR DIPHTHERIA-TETANUS-PERTUSSIS (TDAP) VACCINE: ICD-10-CM

## 2019-03-26 DIAGNOSIS — Z30.431 ENCOUNTER FOR MANAGEMENT OF INTRAUTERINE CONTRACEPTIVE DEVICE (IUD), UNSPECIFIED IUD MANAGEMENT TYPE: Primary | ICD-10-CM

## 2019-03-26 PROCEDURE — 90682 RIV4 VACC RECOMBINANT DNA IM: CPT | Performed by: NURSE PRACTITIONER

## 2019-03-26 PROCEDURE — 99213 OFFICE O/P EST LOW 20 MIN: CPT | Mod: 25 | Performed by: NURSE PRACTITIONER

## 2019-03-26 PROCEDURE — 90472 IMMUNIZATION ADMIN EACH ADD: CPT | Performed by: NURSE PRACTITIONER

## 2019-03-26 PROCEDURE — 90715 TDAP VACCINE 7 YRS/> IM: CPT | Performed by: NURSE PRACTITIONER

## 2019-03-26 PROCEDURE — 90471 IMMUNIZATION ADMIN: CPT | Performed by: NURSE PRACTITIONER

## 2019-03-26 ASSESSMENT — MIFFLIN-ST. JEOR: SCORE: 1696.37

## 2019-03-26 NOTE — PROGRESS NOTES
Asked patient to return call in regards to appt today.   Please verify if appt is just for IUD consult and NOT insertion.   Please ask if pt has had a vaginal birth if this appt is indeed for the insertion.   If not, pt needs to schedule with an OB provider.         Estephania HDZ     [facility]

## 2019-03-26 NOTE — PROGRESS NOTES
"  SUBJECTIVE:   Raquel Van is a 28 year old female who presents to clinic today for the following health issues:      Contraception   Pt got her IUD 3 years ago. Syl. She just need IUD replaced   Method interested in: syl or equivalent             Methods used previously:   Problems with previous methods:     History of pregnancies:         No LMP recorded. Patient is not currently having periods (Reason: IUD).  Cramps are getting a little worst and light spotting for 2-3 days         No results found for: PAP  : 0  Para: 0    History of migraines: YES- once when pt was in highschool. Since then no  Smoker: no  1st degree relative with History of: not known     Accompanying Signs & Symptoms:   Dysuria: no  Vaginal discharge: no  Painful intercourse: no    Precipitating and/or Alleviating factors:    Currently sexually active: YES- one partner   In stable relationship: YES  Desire STD testing: no  Are you planning a pregnancy soon: no      She is not current with immunizations and will be visiting her  niece soon.       Problem list and histories reviewed & adjusted, as indicated.  Additional history: as documented        Reviewed and updated as needed this visit by clinical staff  Meds       Reviewed and updated as needed this visit by Provider             OBJECTIVE:     /82   Pulse 95   Temp 98.2  F (36.8  C) (Oral)   Resp 18   Ht 1.676 m (5' 6\")   Wt 94.5 kg (208 lb 4 oz)   SpO2 99%   BMI 33.61 kg/m    Body mass index is 33.61 kg/m .  GENERAL: healthy, alert and no distress  PSYCH: mentation appears normal, affect normal/bright        ASSESSMENT/PLAN:             1. Encounter for management of intrauterine contraceptive device (IUD), unspecified IUD management type  Discussed Syla vs Mirena.  She will schedule with gyn for pap and IUD replacement.     2. Need for diphtheria-tetanus-pertussis (Tdap) vaccine    - TDAP, IM (10 - 64 YRS) - Adacel  - EA ADD'L VACCINE    3. Needs flu " shot    - ADMIN 1st VACCINE        Bernarda Nunez, NP  [facility]

## 2019-03-26 NOTE — NURSING NOTE
Prior to injection, verified patient identity using patient's name and date of birth.  Due to injection administration, patient instructed to remain in clinic for 15 minutes  afterwards, and to report any adverse reaction to me immediately.    Screening Questionnaire for Adult Immunization    Are you sick today?   No   Do you have allergies to medications, food, a vaccine component or latex?   Yes, penicillin    Have you ever had a serious reaction after receiving a vaccination?   No   Do you have a long-term health problem with heart disease, lung disease, asthma, kidney disease, metabolic disease (e.g. diabetes), anemia, or other blood disorder?   No   Do you have cancer, leukemia, HIV/AIDS, or any other immune system problem?   No   In the past 3 months, have you taken medications that affect  your immune system, such as prednisone, other steroids, or anticancer drugs; drugs for the treatment of rheumatoid arthritis, Crohn s disease, or psoriasis; or have you had radiation treatments?   No   Have you had a seizure, or a brain or other nervous system problem?   No   During the past year, have you received a transfusion of blood or blood     products, or been given immune (gamma) globulin or antiviral drug?   No   For women: Are you pregnant or is there a chance you could become        pregnant during the next month?   No   Have you received any vaccinations in the past 4 weeks?   No     Immunization questionnaire answers were all negative.        Per orders of Bernarda Nunez, injection of Adacel and Flublok given by Lisa West. Patient instructed to remain in clinic for 15 minutes afterwards, and to report any adverse reaction to me immediately.       Screening performed by Lisa West on 3/26/2019 at 4:02 PM.

## 2019-03-26 NOTE — PATIENT INSTRUCTIONS
[facility]   [address], [place] [address]  Telephone: [phone]

## 2019-03-26 NOTE — PROGRESS NOTES
I called and spoke to pt. She is aware that PCP only inserts IUD per vaginal birth.  She will be coming in for consult only     Lisa West MA

## 2019-03-27 ENCOUNTER — OFFICE VISIT (OUTPATIENT)
Dept: MIDWIFE SERVICES | Facility: CLINIC | Age: 29
End: 2019-03-27
Payer: COMMERCIAL

## 2019-03-27 VITALS
BODY MASS INDEX: 33.75 KG/M2 | SYSTOLIC BLOOD PRESSURE: 147 MMHG | HEIGHT: 66 IN | WEIGHT: 210 LBS | HEART RATE: 106 BPM | DIASTOLIC BLOOD PRESSURE: 92 MMHG | TEMPERATURE: 98.9 F

## 2019-03-27 DIAGNOSIS — Z00.00 ANNUAL PHYSICAL EXAM: Primary | ICD-10-CM

## 2019-03-27 DIAGNOSIS — Z30.433 ENCOUNTER FOR REMOVAL AND REINSERTION OF INTRAUTERINE CONTRACEPTIVE DEVICE (IUD): ICD-10-CM

## 2019-03-27 LAB — HCG UR QL: NEGATIVE

## 2019-03-27 PROCEDURE — 81025 URINE PREGNANCY TEST: CPT | Performed by: ADVANCED PRACTICE MIDWIFE

## 2019-03-27 PROCEDURE — 58300 INSERT INTRAUTERINE DEVICE: CPT | Performed by: ADVANCED PRACTICE MIDWIFE

## 2019-03-27 PROCEDURE — G0145 SCR C/V CYTO,THINLAYER,RESCR: HCPCS | Performed by: ADVANCED PRACTICE MIDWIFE

## 2019-03-27 PROCEDURE — 99385 PREV VISIT NEW AGE 18-39: CPT | Mod: 25 | Performed by: ADVANCED PRACTICE MIDWIFE

## 2019-03-27 PROCEDURE — 58301 REMOVE INTRAUTERINE DEVICE: CPT | Performed by: ADVANCED PRACTICE MIDWIFE

## 2019-03-27 PROCEDURE — 87491 CHLMYD TRACH DNA AMP PROBE: CPT | Performed by: ADVANCED PRACTICE MIDWIFE

## 2019-03-27 PROCEDURE — 87591 N.GONORRHOEAE DNA AMP PROB: CPT | Performed by: ADVANCED PRACTICE MIDWIFE

## 2019-03-27 ASSESSMENT — MIFFLIN-ST. JEOR: SCORE: 1704.3

## 2019-03-27 NOTE — NURSING NOTE
"Chief Complaint   Patient presents with     Contraception     IUD insertion and pap. LOT: [id number] EXP: 06/2021 NDC: 07327-485-78.       Initial BP (!) 147/92 (BP Location: Left arm, Patient Position: Sitting, Cuff Size: Adult Regular)   Pulse 106   Temp 98.9  F (37.2  C) (Oral)   Ht 1.676 m (5' 6\")   Wt 95.3 kg (210 lb)   LMP 03/27/2019   BMI 33.89 kg/m   Estimated body mass index is 33.89 kg/m  as calculated from the following:    Height as of this encounter: 1.676 m (5' 6\").    Weight as of this encounter: 95.3 kg (210 lb).  BP completed using cuff size: regular    Questioned patient about current smoking habits.  Pt. has never smoked.      No obstetric history on file.    The following HM Due: pap smear      The following patient reported/Care Every where data was sent to:  P ABSTRACT QUALITY INITIATIVES [[id number]]  SANTY Perez           "

## 2019-03-27 NOTE — PROGRESS NOTES
Raquel is a 28 year old  female who presents for annual exam and IUD removal/reinsertion. She is , and does not desire to have children at this time. Has been happy with her Syl IUD, and wishes to change to Mirena. She met with her primary care provider yesterday for IUD consult, and feels that her questions were answered. She feels that her health is good overall, and that she gets regular activity and follows a healthy diet.     Menses are rare and NA lasting 0 days.  Menses flow: normal and spotty.  Patient's last menstrual period was 2019.. Using IUD for contraception.  She is not currently considering pregnancy.  Besides routine health maintenance, she has no other health concerns today .  GYNECOLOGIC HISTORY:  Menarche: 15  Age at first intercourse: 17 Number of lifetime partners: less than 6  Raquel is sexually active with 1male partner(s) and is currently in monogamous relationship with .    History sexually transmitted infections:No STD history  STI testing offered?  Accepted  SAJAN exposure: No  History of abnormal Pap smear: NO - age 21-29 PAP every 3 years recommended  Family history of breast CA: No  Family history of uterine/ovarian CA: No    Family history of colon CA: No    HEALTH MAINTENANCE:  Cholesterol: (No results found for: CHOL History of abnormal lipids: No-declines today  Mammo: 0 . History of abnormal Mammo: Not applicable, 0.  Regular Self Breast Exams: Yes  Calcium/Vitamin D intake: source:  dairy Adequate? Yes  TSH: (No results found for: TSH )-declines today  Pap; (No results found for: PAP )-collected today    HISTORY:  Obstetric History       T0      L0     SAB0   TAB0   Ectopic0   Multiple0   Live Births0         History reviewed. No pertinent past medical history.  Past Surgical History:   Procedure Laterality Date     BREAST SURGERY  2007    Fibroadenoma removed from left breast     HERNIA REPAIR  1998     Family History   Problem Relation  Age of Onset     Diabetes Father      Diabetes Paternal Grandfather      Diabetes Paternal Grandmother      Hypertension Mother      Ovarian cysts Mother      Hypertension Maternal Grandfather      Hyperlipidemia Maternal Grandfather      Other Cancer Maternal Grandfather         Lung cancer     Social History     Socioeconomic History     Marital status:      Spouse name: None     Number of children: None     Years of education: None     Highest education level: None   Occupational History     None   Social Needs     Financial resource strain: None     Food insecurity:     Worry: None     Inability: None     Transportation needs:     Medical: None     Non-medical: None   Tobacco Use     Smoking status: Never Smoker     Smokeless tobacco: Never Used   Substance and Sexual Activity     Alcohol use: Yes     Comment: a few a month      Drug use: No     Sexual activity: Yes     Partners: Male     Birth control/protection: IUD   Lifestyle     Physical activity:     Days per week: None     Minutes per session: None     Stress: None   Relationships     Social connections:     Talks on phone: None     Gets together: None     Attends Faith service: None     Active member of club or organization: None     Attends meetings of clubs or organizations: None     Relationship status: None     Intimate partner violence:     Fear of current or ex partner: None     Emotionally abused: None     Physically abused: None     Forced sexual activity: None   Other Topics Concern     Parent/sibling w/ CABG, MI or angioplasty before 65F 55M? No   Social History Narrative     None       Current Outpatient Medications:      levonorgestrel (MIRENA, 52 MG,) 20 MCG/24HR IUD, 1 each (20 mcg) by Intrauterine route once for 1 dose, Disp: , Rfl:      Levonorgestrel 13.5 MG IUD, , Disp: , Rfl:      Allergies   Allergen Reactions     Azithromycin Hives     Penicillins Hives       Past medical, surgical, social and family history were reviewed  "and updated in EPIC.    ROS:   C:     NEGATIVE for fever, chills, change in weight  I:       NEGATIVE for worrisome rashes, moles or lesions  E:     NEGATIVE for vision changes or irritation  E/M: NEGATIVE for ear, mouth and throat problems  R:     NEGATIVE for significant cough or SOB  CV:   NEGATIVE for chest pain, palpitations or peripheral edema  GI:     NEGATIVE for nausea, abdominal pain, heartburn, or change in bowel habits  :   NEGATIVE for frequency, dysuria, hematuria, vaginal discharge, or irregular bleeding  M:     NEGATIVE for significant arthralgias or myalgia  N:      NEGATIVE for weakness, dizziness or paresthesias  E:      NEGATIVE for temperature intolerance, skin/hair changes  P:      NEGATIVE for changes in mood or affect.    EXAM:  BP (!) 147/92 (BP Location: Left arm, Patient Position: Sitting, Cuff Size: Adult Regular)   Pulse 106   Temp 98.9  F (37.2  C) (Oral)   Ht 1.676 m (5' 6\")   Wt 95.3 kg (210 lb)   LMP 03/27/2019   BMI 33.89 kg/m     BMI: Body mass index is 33.89 kg/m .  Constitutional: healthy, alert and no distress  Neck: Neck supple. Trachea midline. No adenopathy. Thyroid symmetric, normal size.   Cardiovascular: RRR.   Respiratory: Negative.   Gastrointestinal: Abdomen soft, non-tender, non-distended. No masses, organomegaly.  :  Vulva:  No external lesions, normal female hair distribution, no inguinal adenopathy.    Urethra:  Midline, non-tender, well supported, no discharge  Vagina:  Moist, pink, no abnormal discharge, no lesions; IUD strings coming from cervical os  Uterus:  Normal size, anteverted , non-tender, freely mobile  Ovaries:  No masses appreciated, non-tender, mobile  Musculoskeletal: extremities normal  Skin: no suspicious lesions or rashes  Psychiatric: Affect appropriate, cooperative,mentation appears normal.     IUD REMOVAL PROCEDURE:  The IUD strings were identified at the cervical os  They were easily grasped with a ring forceps and with gentle steady " traction the IUD was removed from the uterus intact and disposed of following the hazardous waste guidelines. Patient tolerated procedure well, no bleeding, and is stable.    IUD INSERTION PROCEDURE:  She is here for an IUD insertion.  Patient has been given written information.  I have reviewed the risks of the IUD including pregnancy, PID, life threatening infection, perforation, expulsion, cramping, changes in bleeding and ovarian cysts. Benefits of the IUD and alternative family planning methods have been discussed.  Patients questions are answered.  Patient has verbalized understanding of risks and benefits and has signed the consent form.    Procedure:  Uterus assessed for position and is Anteverted.  Speculum inserted.  Betadine prep of cervix done.  Tenaculum applied at 2 & 10 o'clock position and gentle traction was applied to elongate the cervical canal.  Uterus sounded to 7.5cm's.  Cervical dilators not used.   IUD inserted in the usual fashion without problems, ie: resistance, severe protracted pain or excessive bleeding.  Tenaculum was removed with scant bleeding from the tenaculum site.  Strings trimmed to 2 cm's.  Patient tolerated the procedure well without any prolonged pain or syncopy.  IUD type: Mirena  Lot # [id number] Exp: 8/2021    COUNSELING:   Reviewed preventive health counseling, as reflected in patient instructions       Regular exercise       Healthy diet/nutrition       Contraception   reports that  has never smoked. she has never used smokeless tobacco.    Body mass index is 33.89 kg/m .  Weight management plan: Discussed healthy diet and exercise guidelines  FRAX Risk Assessment    ASSESSMENT:  28 year old female with satisfactory annual exam  (Z00.00) Annual physical exam  (primary encounter diagnosis)  Plan: Neisseria gonorrhoeae PCR, Chlamydia         trachomatis PCR, PAP imaged thin layer screen    (Z30.433) Encounter for removal and reinsertion of intrauterine contraceptive device  (IUD)  Plan: HCG qualitative urine, REMOVE INTRAUTERINE         DEVICE, INSERTION INTRAUTERINE DEVICE, HC         LEVONORGESTREL IU 52MG 5 YR, levonorgestrel         (MIRENA, 52 MG,) 20 MCG/24HR IUD, Neisseria         gonorrhoeae PCR, Chlamydia trachomatis PCR    GC/Chlamydia Screening:  YES    Instructions given to patient regarding checking IUD strings, returning to the clinic if pain or inability to check strings and/or irregular bleeding.    Pt to RTC in 4-6 weeks for IUD check.    Kar Hernandez CNM

## 2019-03-28 LAB
C TRACH DNA SPEC QL NAA+PROBE: NEGATIVE
N GONORRHOEA DNA SPEC QL NAA+PROBE: NEGATIVE
SPECIMEN SOURCE: NORMAL
SPECIMEN SOURCE: NORMAL

## 2019-03-29 LAB
COPATH REPORT: NORMAL
PAP: NORMAL

## 2020-03-10 ENCOUNTER — HEALTH MAINTENANCE LETTER (OUTPATIENT)
Age: 30
End: 2020-03-10

## 2020-10-27 ENCOUNTER — OFFICE VISIT (OUTPATIENT)
Dept: FAMILY MEDICINE | Facility: CLINIC | Age: 30
End: 2020-10-27
Payer: COMMERCIAL

## 2020-10-27 ENCOUNTER — TELEPHONE (OUTPATIENT)
Dept: FAMILY MEDICINE | Facility: CLINIC | Age: 30
End: 2020-10-27

## 2020-10-27 VITALS
BODY MASS INDEX: 35.21 KG/M2 | TEMPERATURE: 98.1 F | DIASTOLIC BLOOD PRESSURE: 88 MMHG | SYSTOLIC BLOOD PRESSURE: 130 MMHG | RESPIRATION RATE: 18 BRPM | HEART RATE: 86 BPM | WEIGHT: 218.13 LBS | OXYGEN SATURATION: 98 %

## 2020-10-27 DIAGNOSIS — K21.9 GASTROESOPHAGEAL REFLUX DISEASE WITHOUT ESOPHAGITIS: ICD-10-CM

## 2020-10-27 DIAGNOSIS — Z23 NEED FOR PROPHYLACTIC VACCINATION AND INOCULATION AGAINST INFLUENZA: ICD-10-CM

## 2020-10-27 DIAGNOSIS — R07.9 CHEST PAIN, UNSPECIFIED TYPE: ICD-10-CM

## 2020-10-27 DIAGNOSIS — R42 DIZZINESS: Primary | ICD-10-CM

## 2020-10-27 LAB
ERYTHROCYTE [DISTWIDTH] IN BLOOD BY AUTOMATED COUNT: 12.5 % (ref 10–15)
HCT VFR BLD AUTO: 39.8 % (ref 35–47)
HGB BLD-MCNC: 12.6 G/DL (ref 11.7–15.7)
MCH RBC QN AUTO: 28.3 PG (ref 26.5–33)
MCHC RBC AUTO-ENTMCNC: 31.7 G/DL (ref 31.5–36.5)
MCV RBC AUTO: 89 FL (ref 78–100)
PLATELET # BLD AUTO: 396 10E9/L (ref 150–450)
RBC # BLD AUTO: 4.45 10E12/L (ref 3.8–5.2)
WBC # BLD AUTO: 9.2 10E9/L (ref 4–11)

## 2020-10-27 PROCEDURE — 36415 COLL VENOUS BLD VENIPUNCTURE: CPT | Performed by: NURSE PRACTITIONER

## 2020-10-27 PROCEDURE — 90686 IIV4 VACC NO PRSV 0.5 ML IM: CPT | Performed by: NURSE PRACTITIONER

## 2020-10-27 PROCEDURE — 85027 COMPLETE CBC AUTOMATED: CPT | Performed by: NURSE PRACTITIONER

## 2020-10-27 PROCEDURE — 93000 ELECTROCARDIOGRAM COMPLETE: CPT | Performed by: NURSE PRACTITIONER

## 2020-10-27 PROCEDURE — 90471 IMMUNIZATION ADMIN: CPT | Performed by: NURSE PRACTITIONER

## 2020-10-27 PROCEDURE — 80048 BASIC METABOLIC PNL TOTAL CA: CPT | Performed by: NURSE PRACTITIONER

## 2020-10-27 PROCEDURE — 99214 OFFICE O/P EST MOD 30 MIN: CPT | Mod: 25 | Performed by: NURSE PRACTITIONER

## 2020-10-27 RX ORDER — PANTOPRAZOLE SODIUM 40 MG/1
40 TABLET, DELAYED RELEASE ORAL DAILY
Qty: 30 TABLET | Refills: 1 | Status: SHIPPED | OUTPATIENT
Start: 2020-10-27 | End: 2020-10-28

## 2020-10-27 RX ORDER — MECLIZINE HYDROCHLORIDE 25 MG/1
25 TABLET ORAL 3 TIMES DAILY PRN
Qty: 30 TABLET | Refills: 0 | Status: SHIPPED | OUTPATIENT
Start: 2020-10-27 | End: 2021-02-09

## 2020-10-27 RX ORDER — FLUTICASONE PROPIONATE 50 MCG
1 SPRAY, SUSPENSION (ML) NASAL DAILY PRN
COMMUNITY

## 2020-10-27 NOTE — PROGRESS NOTES
Subjective     Raquel Van is a 30 year old female who presents to clinic today for the following health issues:    HPI         Dizziness  Onset/Duration: about 2 weeks, intermittent  Description: ear pain for a couple of months. Mostly left ear ache, initially, now both.     Do you feel faint: YES- faint feeling when having the vertigo   Does it feel like the surroundings (bed, room) are moving: YES  Unsteady/off balance: YES  Have you passed out or fallen: no  Intensity: mild-moderate. 1st day was severe   Progression of Symptoms: same  Accompanying Signs & Symptoms: acid reflux over the past few months. Migraines   Heart palpitations or chest pain: no: sternum pressure   Nausea, vomiting: no  Weakness or lack of coordination in arms or legs: no  Vision or speech changes: sometimes a little bit of tunnel vision   Numbness or tingling: no  Ringing in ears (Tinnitus): YES- more a pain than a ringing   Hearing Loss: no  History:   Head trauma/concussion history: no  Previous similar symptoms: YES: vertigo issues back in 2016. Ear infections a few years ago   Recent bleeding history: no  Any new medications (BP?): no  Precipitating factors:   Worse with activity: no  Worse with head movement: YES, and also in the shower   Alleviating factors:   Does staying in a fixed position give relief: Not really   Therapies tried and outcome: OTC Flonase nasal spray has not helped with dizziness     Pressure on her sternum started yesterday.  Intermittent.  No shortness of breath, cough, pleuritic pain.  No fevers.   She has been having a lot of acid reflux for months.  Almost daily.  Worse when lying down. She did try 2 weeks of Prilosec in August.   No alleviating or aggravating factors.            Review of Systems   CONSTITUTIONAL: NEGATIVE for fever, chills, change in weight  ENT/MOUTH: NEGATIVE for ear, mouth and throat problems  RESP: NEGATIVE for significant cough or SOB  CV: NEGATIVE for palpitations or peripheral  edema  GI: see HPI  : negative for dysuria or hematuria  MUSCULOSKELETAL: NEGATIVE for significant arthralgias or myalgia  NEURO: see HPI  ENDOCRINE: NEGATIVE for temperature intolerance, skin/hair changes  HEME/ALLERGY/IMMUNE: NEGATIVE for bleeding problems  PSYCHIATRIC: NEGATIVE for changes in mood or affect      Objective    /88   Pulse 86   Temp 98.1  F (36.7  C) (Oral)   Resp 18   Wt 98.9 kg (218 lb 2 oz)   SpO2 98%   BMI 35.21 kg/m    Body mass index is 35.21 kg/m .  Physical Exam   GENERAL: healthy, alert and no distress  HENT: ear canals and TM's normal, nose and mouth without ulcers or lesions  NECK: no adenopathy, no asymmetry, masses, or scars and thyroid normal to palpation  RESP: lungs clear to auscultation - no rales, rhonchi or wheezes  CV: regular rate and rhythm, normal S1 S2, no S3 or S4, no murmur, click or rub, no peripheral edema and peripheral pulses strong  ABDOMEN: soft, nontender, no hepatosplenomegaly, no masses and bowel sounds normal  MS: no gross musculoskeletal defects noted, no edema  SKIN: no suspicious lesions or rashes  NEURO: Normal strength and tone, sensory exam grossly normal, mentation intact, cranial nerves 2-12 intact, gait normal including heel/toe/tandem walking, Romberg normal and rapid alternating movements normal  Lateral nystagmus with Huber Halpike  PSYCH: mentation appears normal, affect normal/bright            Assessment & Plan     Dizziness  Differential includes BPPV, labyrinthitis, anemia, electrolyte imbalance.  Will try Meclizine, referral to PT given.    Follow up if symptoms persist or worsen.   - Basic metabolic panel  (Ca, Cl, CO2, Creat, Gluc, K, Na, BUN)  - CBC with platelets  - PHYSICAL THERAPY REFERRAL; Future  - meclizine (ANTIVERT) 25 MG tablet; Take 1 tablet (25 mg) by mouth 3 times daily as needed for dizziness    Chest pain, unspecified type  Likely related to GERD.  EKG shows incomplete RBBB.    If symptoms worsen or she develops any  shortness of breath, she will go to the ED.   - EKG 12-lead complete w/read - Clinics    Gastroesophageal reflux disease without esophagitis  Will start Protonix 40 mg daily.  Discussed staying on for 1-2 months, then tapering off.  Discussed dietary modifications.  Follow up in 2 weeks if symptoms are not improving.    - Helicobacter pylori Antigen Stool; Future    Need for prophylactic vaccination and inoculation against influenza    - INFLUENZA VACCINE IM > 6 MONTHS VALENT IIV4 [37252]  - Vaccine Administration, Initial [69735]            No follow-ups on file.    Bernarda Nunez NP  [facility]

## 2020-10-27 NOTE — PATIENT INSTRUCTIONS
Patient Education     Tips to Control Acid Reflux    To control acid reflux, you ll need to make some basic diet and lifestyle changes. The simple steps outlined below may be all you ll need to ease discomfort.  Watch what you eat    Avoid fatty foods and spicy foods.    Eat fewer acidic foods, such as citrus and tomato-based foods. These can increase symptoms.    Limit drinking alcohol, caffeine, and fizzy beverages. All increase acid reflux.    Try limiting chocolate, peppermint, and spearmint. These can worsen acid reflux in some people.  Watch when you eat    Avoid lying down for 3 hours after eating.    Do not snack before going to bed.  Raise your head  Raising your head and upper body by 4 to 6 inches helps limit reflux when you re lying down. Put blocks under the head of your bed frame to raise it.  Other changes    Lose weight, if you need to    Don t exercise near bedtime    Avoid tight-fitting clothes    Limit aspirin and ibuprofen    Stop smoking   Date Last Reviewed: 7/1/2016 2000-2019 [employer]. [address] [place] [address], [place] [address]. All rights reserved. This information is not intended as a substitute for professional medical care. Always follow your healthcare professional's instructions.

## 2020-10-28 DIAGNOSIS — K21.9 GASTROESOPHAGEAL REFLUX DISEASE WITHOUT ESOPHAGITIS: ICD-10-CM

## 2020-10-28 LAB
ANION GAP SERPL CALCULATED.3IONS-SCNC: 8 MMOL/L (ref 3–14)
BUN SERPL-MCNC: 15 MG/DL (ref 7–30)
CALCIUM SERPL-MCNC: 8.8 MG/DL (ref 8.5–10.1)
CHLORIDE SERPL-SCNC: 107 MMOL/L (ref 94–109)
CO2 SERPL-SCNC: 23 MMOL/L (ref 20–32)
CREAT SERPL-MCNC: 0.82 MG/DL (ref 0.52–1.04)
GFR SERPL CREATININE-BSD FRML MDRD: >90 ML/MIN/{1.73_M2}
GLUCOSE SERPL-MCNC: 86 MG/DL (ref 70–99)
POTASSIUM SERPL-SCNC: 4.6 MMOL/L (ref 3.4–5.3)
SODIUM SERPL-SCNC: 138 MMOL/L (ref 133–144)

## 2020-10-28 PROCEDURE — 87338 HPYLORI STOOL AG IA: CPT | Performed by: NURSE PRACTITIONER

## 2020-10-28 RX ORDER — PANTOPRAZOLE SODIUM 40 MG/1
40 TABLET, DELAYED RELEASE ORAL DAILY
Qty: 90 TABLET | Refills: 0 | Status: SHIPPED | OUTPATIENT
Start: 2020-10-28 | End: 2020-10-28

## 2020-10-28 RX ORDER — PANTOPRAZOLE SODIUM 40 MG/1
40 TABLET, DELAYED RELEASE ORAL DAILY
Qty: 90 TABLET | Refills: 0 | Status: SHIPPED | OUTPATIENT
Start: 2020-10-28 | End: 2021-01-19

## 2020-10-28 NOTE — TELEPHONE ENCOUNTER
Patient's insurance requires 90 day supply of pantoprazole.    Medication re-sent to pharmacy.  PASQUALE BernabeN, RN

## 2020-10-29 LAB — H PYLORI AG STL QL IA: NEGATIVE

## 2020-10-30 ENCOUNTER — HOSPITAL ENCOUNTER (OUTPATIENT)
Dept: PHYSICAL THERAPY | Facility: CLINIC | Age: 30
Setting detail: THERAPIES SERIES
End: 2020-10-30
Attending: NURSE PRACTITIONER
Payer: COMMERCIAL

## 2020-10-30 DIAGNOSIS — R42 DIZZINESS: ICD-10-CM

## 2020-10-30 PROCEDURE — 97161 PT EVAL LOW COMPLEX 20 MIN: CPT | Mod: GP | Performed by: PHYSICAL THERAPIST

## 2020-10-30 PROCEDURE — 95992 CANALITH REPOSITIONING PROC: CPT | Mod: GP | Performed by: PHYSICAL THERAPIST

## 2020-12-27 ENCOUNTER — HEALTH MAINTENANCE LETTER (OUTPATIENT)
Age: 30
End: 2020-12-27

## 2021-01-19 ENCOUNTER — OFFICE VISIT (OUTPATIENT)
Dept: URGENT CARE | Facility: URGENT CARE | Age: 31
End: 2021-01-19
Payer: COMMERCIAL

## 2021-01-19 ENCOUNTER — NURSE TRIAGE (OUTPATIENT)
Dept: NURSING | Facility: CLINIC | Age: 31
End: 2021-01-19

## 2021-01-19 VITALS
BODY MASS INDEX: 32.96 KG/M2 | HEIGHT: 67 IN | HEART RATE: 100 BPM | SYSTOLIC BLOOD PRESSURE: 137 MMHG | OXYGEN SATURATION: 98 % | DIASTOLIC BLOOD PRESSURE: 88 MMHG | WEIGHT: 210 LBS | TEMPERATURE: 98.4 F

## 2021-01-19 DIAGNOSIS — R10.13 EPIGASTRIC PAIN: Primary | ICD-10-CM

## 2021-01-19 DIAGNOSIS — K21.9 GASTROESOPHAGEAL REFLUX DISEASE WITHOUT ESOPHAGITIS: ICD-10-CM

## 2021-01-19 DIAGNOSIS — R10.11 RUQ ABDOMINAL PAIN: ICD-10-CM

## 2021-01-19 PROCEDURE — 36415 COLL VENOUS BLD VENIPUNCTURE: CPT | Performed by: INTERNAL MEDICINE

## 2021-01-19 PROCEDURE — 80076 HEPATIC FUNCTION PANEL: CPT | Performed by: INTERNAL MEDICINE

## 2021-01-19 PROCEDURE — 99213 OFFICE O/P EST LOW 20 MIN: CPT | Performed by: INTERNAL MEDICINE

## 2021-01-19 RX ORDER — PANTOPRAZOLE SODIUM 40 MG/1
40 TABLET, DELAYED RELEASE ORAL DAILY
Qty: 90 TABLET | Refills: 0 | Status: SHIPPED | OUTPATIENT
Start: 2021-01-19 | End: 2022-02-07 | Stop reason: ALTCHOICE

## 2021-01-19 ASSESSMENT — ENCOUNTER SYMPTOMS
FEVER: 0
DIARRHEA: 0
CONSTIPATION: 0
ACTIVITY CHANGE: 1
BACK PAIN: 1
COUGH: 0
DYSURIA: 0
NAUSEA: 0
BLOOD IN STOOL: 0
APPETITE CHANGE: 0
VOMITING: 0
SHORTNESS OF BREATH: 0
PALPITATIONS: 0

## 2021-01-19 ASSESSMENT — MIFFLIN-ST. JEOR: SCORE: 1710.18

## 2021-01-19 NOTE — PROGRESS NOTES
ASSESSMENT:      ICD-10-CM    1. Epigastric pain  R10.13 Hepatic panel (Albumin, ALT, AST, Bili, Alk Phos, TP)   2. RUQ abdominal pain  R10.11 Hepatic panel (Albumin, ALT, AST, Bili, Alk Phos, TP)   3. Gastroesophageal reflux disease without esophagitis  K21.9 pantoprazole (PROTONIX) 40 MG EC tablet        Medical Decision Making:  Reassured symptoms are not covid    Serious Comorbid Conditions:  Hx reflux      PLAN:      Patient Instructions   Stomach pain with right upper quadrant pain today    Take Protonix 2 x day for few weeks  Avoid caffeine, spicy foods, wine until recheck    At this time, not suspicious for frozen diaphragm    Screen liver function tests  If symptoms do not improved, consider ultrasound with primary    Recheck appointment 1-2 wweeeks.    Call or return to clinic if symptoms worsen or fail to improve as anticipated.            SUBJECTIVE:   Raquel Van is a 30 year old female presenting with a chief complaint of   Chief Complaint   Patient presents with     Urgent Care     Pt in clinic to have eval for right side upper abdominal pain that radiates into right shoulder and back.     Abdominal Pain     Shoulder right     Back Pain         She is an established patient of [facility].      Last night having pain in upper abdomen along diaphragm on both sides, dull pain 7/10  This morning mostly on right side   previous    Pain improved with rest today 5/10  Now feels like pressure    Doesn't feel like her heart burn    Gf - copd, frozen diaphragm    She is concerned about her diaphram    Review of Systems   Constitutional: Positive for activity change. Negative for appetite change and fever.   HENT: Negative.    Respiratory: Negative for cough and shortness of breath.    Cardiovascular: Negative for palpitations.   Gastrointestinal: Negative for blood in stool, constipation, diarrhea, nausea and vomiting.        Bloating - little   Genitourinary: Negative for dysuria.   Musculoskeletal: Positive  "for back pain (yesterday.  not associated with pain).       History reviewed. No pertinent past medical history.  Family History   Problem Relation Age of Onset     Diabetes Father      Diabetes Paternal Grandfather      Diabetes Paternal Grandmother      Hypertension Mother      Ovarian cysts Mother      Hypertension Maternal Grandfather      Hyperlipidemia Maternal Grandfather      Other Cancer Maternal Grandfather         Lung cancer     Current Outpatient Medications   Medication Sig Dispense Refill     Levonorgestrel 13.5 MG IUD        pantoprazole (PROTONIX) 40 MG EC tablet Take 1 tablet (40 mg) by mouth daily 90 tablet 0     fluticasone (FLONASE) 50 MCG/ACT nasal spray Spray 1 spray into both nostrils daily as needed for rhinitis or allergies       meclizine (ANTIVERT) 25 MG tablet Take 1 tablet (25 mg) by mouth 3 times daily as needed for dizziness (Patient not taking: Reported on 1/19/2021) 30 tablet 0     Social History     Tobacco Use     Smoking status: Never Smoker     Smokeless tobacco: Never Used   Substance Use Topics     Alcohol use: Yes     Comment: a few a month        OBJECTIVE  /88   Pulse 100   Temp 98.4  F (36.9  C) (Oral)   Ht 1.702 m (5' 7\")   Wt 95.3 kg (210 lb)   LMP 01/13/2021   SpO2 98%   BMI 32.89 kg/m      Physical Exam  Constitutional:       General: She is not in acute distress.     Appearance: Normal appearance. She is not ill-appearing.   HENT:      Mouth/Throat:      Mouth: Mucous membranes are moist.      Pharynx: Oropharynx is clear.   Eyes:      General: No scleral icterus.     Conjunctiva/sclera: Conjunctivae normal.   Cardiovascular:      Rate and Rhythm: Normal rate and regular rhythm.      Pulses: Normal pulses.      Heart sounds: Normal heart sounds.   Pulmonary:      Effort: Pulmonary effort is normal.      Breath sounds: Normal breath sounds. No rhonchi.   Chest:      Chest wall: No tenderness.   Abdominal:      General: Bowel sounds are normal. There is no " distension.      Palpations: Abdomen is soft.      Tenderness: There is abdominal tenderness (epigastric  >> RUQ). There is no right CVA tenderness, left CVA tenderness, guarding or rebound.   Skin:     Coloration: Skin is not jaundiced.   Neurological:      General: No focal deficit present.      Mental Status: She is alert.   Psychiatric:         Mood and Affect: Mood normal.         Labs:  No results found for this or any previous visit (from the past 24 hour(s)).

## 2021-01-19 NOTE — TELEPHONE ENCOUNTER
"Triage call:     Chest pain started last night and continues this morning.   Different than previous experience with Pleurisy.  Pain starts in sternum and travels around to spine only on right side. Describes this pain as dull pain that feels \"Krinkly\" and spreads from front to back.   Pain is worse with exertion.  Rates pain at 2/10 at rest and 6/10 with activity.     Denies personal history of heart or lung disease, but will occasionally have a brief period of sharp chest pain, but this is different.   Denies radiating pain or perspiration.     Has an IUD for BC. LMP was last week.    Per protocol, ER/UC or office visit with PCP requesting second level triage. Please have PCP review and advise.     KRZYSZTOF Rosen MAN Nurse Educator 10:36 AM 1/19/2021    COVID 19 Nurse Triage Plan/Patient Instructions    Please be aware that novel coronavirus (COVID-19) may be circulating in the community. If you develop symptoms such as fever, cough, or SOB or if you have concerns about the presence of another infection including coronavirus (COVID-19), please contact your health care provider or visit [email or web address].     Disposition/Instructions    ED/UC/or Office Visit recommended. Follow protocol based instructions. Requesting second level triage.     Bring Your Own Device:  Please also bring your smart device(s) (smart phones, tablets, laptops) and their charging cables for your personal use and to communicate with your care team during your visit.    Thank you for taking steps to prevent the spread of this virus.  o Limit your contact with others.  o Wear a simple mask to cover your cough.  o Wash your hands well and often.    Resources    [facility]: About COVID-19: [email or web address]    CDC: What to Do If You're Sick: www.cdc.gov/coronavirus/2019-ncov/about/steps-when-sick.html    CDC: Ending Home Isolation: www.cdc.gov/coronavirus/2019-ncov/hcp/disposition-in-home-patients.html     CDC: Caring " for Someone: www.cdc.gov/coronavirus/2019-ncov/if-you-are-sick/care-for-someone.html     [facility]: Interim Guidance for Hospital Discharge to Home: www.health.[facility].[place]/diseases/coronavirus/hcp/hospdischarge.pdf    [facility] clinical trials (COVID-19 research studies): [email or web address]     Below are the COVID-19 hotlines at the [place] [facility] ([facility]). Interpreters are available.   o For health questions: Call 542-502-0644 or 1-925.427.4337 (7 a.m. to 7 p.m.)  o For questions about schools and childcare: Call 877-872-6315 or 1-121.608.6349 (7 a.m. to 7 p.m.)                          Additional Information    Negative: Severe difficulty breathing (e.g., struggling for each breath, speaks in single words)    Negative: Passed out (i.e., fainted, collapsed and was not responding)    Negative: Chest pain lasting longer than 5 minutes and ANY of the following:* Over 50 years old* Over 30 years old and at least one cardiac risk factor (i.e., high blood pressure, diabetes, high cholesterol, obesity, smoker or strong family history of heart disease)* Pain is crushing, pressure-like, or heavy * Took nitroglycerin and chest pain was not relieved* History of heart disease (i.e., angina, heart attack, bypass surgery, angioplasty, CHF)    Negative: Visible sweat on face or sweat dripping down face    Negative: Sounds like a life-threatening emergency to the triager    Negative: Followed an injury to chest    Negative: Cocaine use within last 3 days    Negative: History of prior 'blood clot' in leg or lungs (i.e., deep vein thrombosis, pulmonary embolism)    Negative: Recent illness requiring prolonged bed rest (i.e., immobilization)    Negative: Hip or leg fracture in past 2 months (e.g, or had cast on leg or ankle)    Negative: Major surgery in the past month    Negative: Recent long-distance travel with prolonged time in car, bus, plane, or train (i.e., within past 2 weeks; 6  or more hours duration)    Negative: Heart beating irregularly or very rapidly    Negative: SEVERE chest pain    Negative: Pain also present in shoulder(s) or arm(s) or jaw    Negative: Difficulty breathing    Chest pain lasting longer than 5 minutes    Protocols used: CHEST PAIN-A-[place]

## 2021-01-19 NOTE — PATIENT INSTRUCTIONS
Stomach pain with right upper quadrant pain today    Take Protonix 2 x day for few weeks  Avoid caffeine, spicy foods, wine until recheck    At this time, not suspicious for frozen diaphragm    Screen liver function tests  If symptoms do not improved, consider ultrasound with primary    Recheck appointment 1-2 wweeeks.    Call or return to clinic if symptoms worsen or fail to improve as anticipated.

## 2021-01-19 NOTE — TELEPHONE ENCOUNTER
Writer called patient, reviewed recommendation per LEIGHA Nunez CNP, along with hours for [facility] today.    Patient verbalized understanding and in agreement with plan.    MANJIT Bernabe, RN  [facility]

## 2021-01-20 LAB
ALBUMIN SERPL-MCNC: 3.8 G/DL (ref 3.4–5)
ALP SERPL-CCNC: 59 U/L (ref 40–150)
ALT SERPL W P-5'-P-CCNC: 18 U/L (ref 0–50)
AST SERPL W P-5'-P-CCNC: 9 U/L (ref 0–45)
BILIRUB DIRECT SERPL-MCNC: <0.1 MG/DL (ref 0–0.2)
BILIRUB SERPL-MCNC: 0.4 MG/DL (ref 0.2–1.3)
PROT SERPL-MCNC: 7.7 G/DL (ref 6.8–8.8)

## 2021-02-09 ENCOUNTER — OFFICE VISIT (OUTPATIENT)
Dept: FAMILY MEDICINE | Facility: CLINIC | Age: 31
End: 2021-02-09
Payer: COMMERCIAL

## 2021-02-09 VITALS
BODY MASS INDEX: 33.05 KG/M2 | WEIGHT: 211 LBS | OXYGEN SATURATION: 99 % | SYSTOLIC BLOOD PRESSURE: 121 MMHG | HEART RATE: 59 BPM | TEMPERATURE: 98.4 F | DIASTOLIC BLOOD PRESSURE: 76 MMHG

## 2021-02-09 DIAGNOSIS — R10.13 ABDOMINAL PAIN, EPIGASTRIC: Primary | ICD-10-CM

## 2021-02-09 PROCEDURE — 99213 OFFICE O/P EST LOW 20 MIN: CPT | Performed by: NURSE PRACTITIONER

## 2021-02-09 RX ORDER — PANTOPRAZOLE SODIUM 20 MG/1
20 TABLET, DELAYED RELEASE ORAL DAILY
Qty: 30 TABLET | Refills: 0 | Status: SHIPPED | OUTPATIENT
Start: 2021-02-09 | End: 2021-04-29

## 2021-02-09 NOTE — PROGRESS NOTES
"    Assessment & Plan     Abdominal pain, epigastric  Improved, but still present  Will get an abdominal ultrasound to rule out cholelithiasis.  If normal, will refer to GI for consult regarding any other tests needed (upper EGD, HIDA scan, etc).   - US Abdomen Complete; Future  - GASTROENTEROLOGY ADULT REF CONSULT ONLY; Future  - pantoprazole (PROTONIX) 20 MG EC tablet; Take 1 tablet (20 mg) by mouth daily             BMI:   Estimated body mass index is 33.05 kg/m  as calculated from the following:    Height as of 1/19/21: 1.702 m (5' 7\").    Weight as of this encounter: 95.7 kg (211 lb).   Weight management plan: Discussed healthy diet and exercise guidelines        No follow-ups on file.    Bernarda Nunez NP  [facility]    Sandy García is a 30 year old who presents for the following health issues     HPI       ED/UC Followup:    Facility: [facility]  Date of visit: 1/19/2021  Reason for visit: Chest pain   Current Status: Not feeling much better      Her Protonix was increased to BID.  She has really modified her diet.  Symptoms are improved, but still having epigastric pressure.  Chest pain is resolved.  She is no longer having heartburn.          Review of Systems         Objective    /76   Pulse 59   Temp 98.4  F (36.9  C) (Oral)   Wt 95.7 kg (211 lb)   LMP 01/13/2021   SpO2 99%   BMI 33.05 kg/m    Body mass index is 33.05 kg/m .  Physical Exam   GENERAL: healthy, alert and no distress  PSYCH: mentation appears normal, affect normal/bright                  "

## 2021-02-12 ENCOUNTER — ANCILLARY PROCEDURE (OUTPATIENT)
Dept: ULTRASOUND IMAGING | Facility: CLINIC | Age: 31
End: 2021-02-12
Attending: NURSE PRACTITIONER
Payer: COMMERCIAL

## 2021-02-12 DIAGNOSIS — R10.13 ABDOMINAL PAIN, EPIGASTRIC: ICD-10-CM

## 2021-02-12 PROCEDURE — 76700 US EXAM ABDOM COMPLETE: CPT | Mod: GC | Performed by: RADIOLOGY

## 2021-02-23 NOTE — TELEPHONE ENCOUNTER
REFERRAL INFORMATION:    Referring Provider:  Bernarda Nunez NP     Referring Clinic:  [facility]     Reason for Visit/Diagnosis: Abdominal pain     FUTURE VISIT INFORMATION:    Appointment Date: 3/16/2021    Appointment Time: 12 PM      NOTES STATUS DETAILS   OFFICE NOTE from Referring Provider Internal 2/9/2021 Office visit with Bernarda Nunez NP      OFFICE NOTE from Other Specialist Internal 1/19/2021 Office visit with Dr. Phyllis Waller ([facility])     8/4/17 Office visit with Dr. Brijesh Anderson ([facility])      HOSPITAL DISCHARGE SUMMARY/  ED VISITS Care Everywhere 6/30/15 ([facility])    OPERATIVE REPORT N/A    MEDICATION LIST Internal         ENDOSCOPY  N/A    COLONOSCOPY N/A    ERCP N/A    EUS N/A    STOOL TESTING Internal 10/28/2020   PERTINENT LABS Internal    PATHOLOGY REPORTS (RELATED) N/A    IMAGING (CT, MRI, EGD, MRCP, Small Bowel Follow Through/SBT, MR/CT Enterography) Internal US Abdomen: 2/12/2021, 8/4/17

## 2021-03-16 ENCOUNTER — PRE VISIT (OUTPATIENT)
Dept: GASTROENTEROLOGY | Facility: CLINIC | Age: 31
End: 2021-03-16

## 2021-04-27 ENCOUNTER — VIRTUAL VISIT (OUTPATIENT)
Dept: GASTROENTEROLOGY | Facility: CLINIC | Age: 31
End: 2021-04-27
Attending: NURSE PRACTITIONER
Payer: COMMERCIAL

## 2021-04-27 VITALS — HEIGHT: 67 IN | BODY MASS INDEX: 33.12 KG/M2 | WEIGHT: 211 LBS

## 2021-04-27 DIAGNOSIS — K59.00 CONSTIPATION, UNSPECIFIED CONSTIPATION TYPE: ICD-10-CM

## 2021-04-27 DIAGNOSIS — R10.11 ABDOMINAL PAIN, RIGHT UPPER QUADRANT: ICD-10-CM

## 2021-04-27 DIAGNOSIS — R10.13 ABDOMINAL PAIN, EPIGASTRIC: Primary | ICD-10-CM

## 2021-04-27 DIAGNOSIS — R14.0 ABDOMINAL BLOATING: ICD-10-CM

## 2021-04-27 PROCEDURE — 99203 OFFICE O/P NEW LOW 30 MIN: CPT | Mod: GT | Performed by: PHYSICIAN ASSISTANT

## 2021-04-27 RX ORDER — DICYCLOMINE HYDROCHLORIDE 10 MG/1
10 CAPSULE ORAL 4 TIMES DAILY PRN
Qty: 60 CAPSULE | Refills: 1 | Status: SHIPPED | OUTPATIENT
Start: 2021-04-27 | End: 2021-09-29

## 2021-04-27 ASSESSMENT — PAIN SCALES - GENERAL: PAINLEVEL: MILD PAIN (3)

## 2021-04-27 ASSESSMENT — MIFFLIN-ST. JEOR: SCORE: 1709.72

## 2021-04-27 NOTE — PROGRESS NOTES
GI CLINIC VISIT    CC/REFERRING MD:  Bernarda Nunez  REASON FOR CONSULTATION: abdominal pain, epigastric     ASSESSMENT/PLAN:  1. Abdominal pain (RUQ, epigastric), constipation, bloating   Patient reports right upper quadrant/epigastric pain starting in February of this year.  Has had reassuring labs and right upper quadrant ultrasound.  Does also report history of GERD, symptoms well controlled on PPI.  Stool testing negative for H. pylori.  Differential for symptoms include epigastric pain syndrome/functional dyspepsia, abdominal wall pain, irritable bowel syndrome, biliary dyskinesia.  Given persistent symptoms despite PPI, would recommend evaluation with upper endoscopy.  Can also try Bentyl as needed.  Medication discussed.    Underlying constipation may also be contributing to upper GI symptoms.  Would recommend trial of MiraLAX and adding more fiber into diet.  Should also continue to drink adequate water.    Future considerations include HIDA scan though her symptoms do not fit with classic biliary colic.  --Upper endoscopy  --Try Bentyl as needed for abdominal pain  --Continue Protonix 20 mg a day on an empty stomach 30 to 60 minutes before eating or drinking  --Let us know if symptoms change or worsen  --Start MiraLAX on a daily basis.  You can try one half capful a day, increase as needed.  Goal is for soft bowel movement every day or every other day    2. Colorectal cancer screening: Patient has no personal or family history of colorectal cancer.  Recommend starting surveillance screening at the age of 45-50 unless symptomatic sooner.    RTC 2 months    Thank you for this consultation.  It was a pleasure to participate in the care of this patient; please contact us with any further questions.     38 Minutes was spent on the date of the encounter during chart review, history and exam, documentation, and further activities as noted     Chelo Landa PA-C  Division of Gastroenterology, Hepatology &  "Nutrition  [facility]        HPI  Raquel Van is a 31 year old woman presenting for epigastric pressure. She is new to [facility] and this is my first encounter with the patient.      The patient reports long-standing bloating and gas issues. She will wake up and feel fine, but then 30 minutes later she will feel very bloated and \"tight\". She will try gas relief pills which do not improve her symptoms. This happens a few times a year. She also notes an overall feeling of being overall bloated and gassy. She does also have distension. Chipotle seems to be a big trigger, she has not stopped.     She reports RUQ pain starting in February no new medications or travel at this time. The severity can vary, but is constant at a low level, and occasionally it can spike up to a more noticeable level. She states this feels like a \"pressure/pushing sensation\". She has cut out alcohol, coffee, spicy foods, acidic foods and symptoms have not improved. She does continue to have dairy. This can fade over time. She has tried tums or gas-ex. She is not sure if bowel movements affect this. She does not note association with eating. No nausea, vomiting, early satiety.     Bowel movements- once every other day usually, other days will have three bowel movements. Occasionally will have to strain. This can happen 1-2 times a month. When she goes she will have loose stools. No melena or hematochezia. She has not tried medications for bowel movements.     GERD has worsened in the last years. She mainly feels burning in her chest, can sometimes feel acid in the back of her throat. This is worsened at the end of the day. She has not been able to identify a food trigger. She is on PPI and has been on increased dose- but then was prescribed dose to wean down and is planning to go down to 20 mg a day. Very rarely would she have symptoms in the middle of this night.     Previous workup:   Normal hepatic panel, h pylori, RUQ US "     ROS:    No fevers or chills  No weight loss  No blurry vision, double vision or change in vision  No sore throat  No lymphadenopathy  No headache, paraesthesias, or weakness in a limb  No shortness of breath or wheezing  No chest pain or pressure  No arthralgias or myalgias  No rashes or skin changes  No odynophagia or dysphagia  No BRBPR, hematochezia, melena  No dysuria, frequency or urgency  No hot/cold intolerance or polyria  No anxiety or depression    PERTINENT PAST MEDICAL HISTORY:  None     PREVIOUS SURGERIES:  Past Surgical History:   Procedure Laterality Date     BREAST SURGERY  March 2007    Fibroadenoma removed from left breast     HERNIA REPAIR  July 1998       PREVIOUS ENDOSCOPY:  None     ALLERGIES:     Allergies   Allergen Reactions     Azithromycin Hives     Penicillins Hives       PERTINENT MEDICATIONS:    Current Outpatient Medications:      fluticasone (FLONASE) 50 MCG/ACT nasal spray, Spray 1 spray into both nostrils daily as needed for rhinitis or allergies, Disp: , Rfl:      Levonorgestrel 13.5 MG IUD, , Disp: , Rfl:      pantoprazole (PROTONIX) 20 MG EC tablet, Take 1 tablet (20 mg) by mouth daily, Disp: 30 tablet, Rfl: 0     pantoprazole (PROTONIX) 40 MG EC tablet, Take 1 tablet (40 mg) by mouth daily (Patient taking differently: Take 40 mg by mouth 2 times daily ), Disp: 90 tablet, Rfl: 0   varely rare NSAIDs     SOCIAL HISTORY:  Rare alcohol, no drugs   Social History     Socioeconomic History     Marital status:      Spouse name: Not on file     Number of children: Not on file     Years of education: Not on file     Highest education level: Not on file   Occupational History     Not on file   Social Needs     Financial resource strain: Not on file     Food insecurity     Worry: Not on file     Inability: Not on file     Transportation needs     Medical: Not on file     Non-medical: Not on file   Tobacco Use     Smoking status: Never Smoker     Smokeless tobacco: Never Used    Substance and Sexual Activity     Alcohol use: Not Currently     Comment: a few a month      Drug use: No     Sexual activity: Yes     Partners: Male     Birth control/protection: I.U.D.   Lifestyle     Physical activity     Days per week: Not on file     Minutes per session: Not on file     Stress: Not on file   Relationships     Social connections     Talks on phone: Not on file     Gets together: Not on file     Attends [employer] service: Not on file     Active member of club or organization: Not on file     Attends meetings of clubs or organizations: Not on file     Relationship status: Not on file     Intimate partner violence     Fear of current or ex partner: Not on file     Emotionally abused: Not on file     Physically abused: Not on file     Forced sexual activity: Not on file   Other Topics Concern     Parent/sibling w/ CABG, MI or angioplasty before 65F 55M? No   Social History Narrative     Not on file       FAMILY HISTORY:  FH of CRC: none   FH of IBD: none   Mother with similar symptoms   Family History   Problem Relation Age of Onset     Diabetes Father      Diabetes Paternal Grandfather      Diabetes Paternal Grandmother      Hypertension Mother      Ovarian cysts Mother      Hypertension Maternal Grandfather      Hyperlipidemia Maternal Grandfather      Other Cancer Maternal Grandfather         Lung cancer       Past/family/social history reviewed and no changes    PHYSICAL EXAMINATION:  Constitutional: aaox3, cooperative, pleasant, not dyspneic/diaphoretic, no acute distress  Vitals reviewed: There were no vitals taken for this visit.  Wt:   Wt Readings from Last 2 Encounters:   02/09/21 95.7 kg (211 lb)   01/19/21 95.3 kg (210 lb)   General appearance: Healthy appearing adult, in no acute distress  Eyes: Sclera anicteric  Ears, nose, mouth and throat: No obvious external lesions of ears and nose, Hearing intact  Neck: Symmetric, No obvious external lesions  Respiratory: Normal respiration, no  use of accessory muscles   Skin: No rashes or jaundice   Psychiatric: Oriented to person, place and time, Appropriate mood and affect.     PERTINENT STUDIES:    Office Visit on 01/19/2021   Component Date Value Ref Range Status     Bilirubin Direct 01/19/2021 <0.1  0.0 - 0.2 mg/dL Final     Bilirubin Total 01/19/2021 0.4  0.2 - 1.3 mg/dL Final     Albumin 01/19/2021 3.8  3.4 - 5.0 g/dL Final     Protein Total 01/19/2021 7.7  6.8 - 8.8 g/dL Final     Alkaline Phosphatase 01/19/2021 59  40 - 150 U/L Final     ALT 01/19/2021 18  0 - 50 U/L Final     AST 01/19/2021 9  0 - 45 U/L Final

## 2021-04-27 NOTE — NURSING NOTE
"Chief Complaint   Patient presents with     Consult     Virtual consult       Vitals:    04/27/21 1641   Weight: 95.7 kg (211 lb)   Height: 1.702 m (5' 7\")       Body mass index is 33.05 kg/m .      AARON MataT                      "

## 2021-04-27 NOTE — PROGRESS NOTES
"Raquel Van is a 31 year old female who is being evaluated via a billable video visit.      The patient has been notified of following:     \"This video visit will be conducted via a call between you and your physician/provider. We have found that certain health care needs can be provided without the need for an in-person physical exam.  This service lets us provide the care you need with a video conversation.  If a prescription is necessary we can send it directly to your pharmacy.  If lab work is needed we can place an order for that and you can then stop by our lab to have the test done at a later time.    If during the course of the call the physician/provider feels a video visit is not appropriate, you will not be charged for this service.\"     Patient confirmed that they are in [place] for today's visit Yes    Video-Visit Details  Type of service:  Video Visit    Video Start Time: 4:52 PM  Video End Time:  5:22 PM    Originating Location (pt. Location): Home    Distant Location (provider location):  [facility] [place]     Platform used: [employer]            "

## 2021-04-27 NOTE — PATIENT INSTRUCTIONS
It was a pleasure taking care of you today.  I've included a brief summary of our discussion and care plan from today's visit below.  Please review this information with your primary care provider.  ______________________________________________________________________    My recommendations are summarized as follows:    --Upper endoscopy  --Try Bentyl as needed for abdominal pain  --Continue Protonix 20 mg a day on an empty stomach 30 to 60 minutes before eating or drinking  --Let us know if symptoms change or worsen  --Start MiraLAX on a daily basis.  You can try one half capful a day, increase as needed.  Goal is for soft bowel movement every day or every other day    Return to GI Clinic in 2 months to review your progress.    ______________________________________________________________________    How do I schedule labs, imaging studies, or procedures that were ordered in clinic today?     Labs: To schedule lab appointment at the [facility], use my chart or call [phone]. If you have a [facility] lab closer to home where you are regularly seen you can give them a call.     Procedures: If a colonoscopy, upper endoscopy, breath test, esophageal manometry, or pH impedence was ordered today, our endoscopy team will call you to schedule this. If you have not heard from our endoscopy team within a week, please call [phone] to schedule.     Imaging Studies: If you were scheduled for a CT scan, X-ray, MRI, ultrasound, HIDA scan or other imaging study, please call [phone] to have this scheduled.     Referral: If a referral to another specialty was ordered, expect a phone call or follow instructions above. If you have not heard from anyone regarding your referral in a week, please call our clinic to check the status.     Who do I call with any questions after my visit?  Please be in touch if there are any further questions that arise following today's visit.  There are multiple ways to contact  your gastroenterology care team.        During business hours, you may reach a Gastroenterology nurse at [phone]      To schedule or reschedule an appointment, please call [phone].       You can always send a secure message through [employer].  [employer] messages are answered by your nurse or doctor typically within 24 hours.  Please allow extra time on weekends and holidays.        For urgent/emergent questions after business hours, you may reach the on-call GI Fellow by contacting the [facility] at [phone].     How will I get the results of any tests ordered?    You will receive all of your results.  If you have signed up for [employer], any tests ordered at your visit will be available to you after your physician reviews them.  Typically this takes 1-2 weeks.  If there are urgent results that require a change in your care plan, your physician or nurse will call you to discuss the next steps.      What is Aud[employer]?  [employer]aster is a secure way for you to access all of your healthcare records from the [facility].  It is a web based computer program, so you can sign on to it from any location.  It also allows you to send secure messages to your care team.  I recommend signing up for [employer] access if you have not already done so and are comfortable with using a computer.      How to I schedule a follow-up visit?  If you did not schedule a follow-up visit today, please call [phone] to schedule a follow-up office visit.      Sincerely,    Chelo Landa PA-C  Division of Gastroenterology, Hepatology & Nutrition  [facility]

## 2021-04-27 NOTE — LETTER
4/27/2021         RE: Raquel Van  [address]  [place] [address]        Dear Colleague,    Thank you for referring your patient, Raquel Van, to the [facility] [place]. Please see a copy of my visit note below.    GI CLINIC VISIT    CC/REFERRING MD:  Bernarda Nunez  REASON FOR CONSULTATION: abdominal pain, epigastric     ASSESSMENT/PLAN:  1. Abdominal pain (RUQ, epigastric), constipation, bloating   Patient reports right upper quadrant/epigastric pain starting in February of this year.  Has had reassuring labs and right upper quadrant ultrasound.  Does also report history of GERD, symptoms well controlled on PPI.  Stool testing negative for H. pylori.  Differential for symptoms include epigastric pain syndrome/functional dyspepsia, abdominal wall pain, irritable bowel syndrome, biliary dyskinesia.  Given persistent symptoms despite PPI, would recommend evaluation with upper endoscopy.  Can also try Bentyl as needed.  Medication discussed.    Underlying constipation may also be contributing to upper GI symptoms.  Would recommend trial of MiraLAX and adding more fiber into diet.  Should also continue to drink adequate water.    Future considerations include HIDA scan though her symptoms do not fit with classic biliary colic.  --Upper endoscopy  --Try Bentyl as needed for abdominal pain  --Continue Protonix 20 mg a day on an empty stomach 30 to 60 minutes before eating or drinking  --Let us know if symptoms change or worsen  --Start MiraLAX on a daily basis.  You can try one half capful a day, increase as needed.  Goal is for soft bowel movement every day or every other day    2. Colorectal cancer screening: Patient has no personal or family history of colorectal cancer.  Recommend starting surveillance screening at the age of 45-50 unless symptomatic sooner.    RTC 2 months    Thank you for this consultation.  It was a pleasure to participate in the care of this patient;  "please contact us with any further questions.     38 Minutes was spent on the date of the encounter during chart review, history and exam, documentation, and further activities as noted     Chelo Landa PA-C  Division of Gastroenterology, Hepatology & Nutrition  [facility]  Raquel Van is a 31 year old woman presenting for epigastric pressure. She is new to [facility] GI clinic and this is my first encounter with the patient.      The patient reports long-standing bloating and gas issues. She will wake up and feel fine, but then 30 minutes later she will feel very bloated and \"tight\". She will try gas relief pills which do not improve her symptoms. This happens a few times a year. She also notes an overall feeling of being overall bloated and gassy. She does also have distension. Chipotle seems to be a big trigger, she has not stopped.     She reports RUQ pain starting in February no new medications or travel at this time. The severity can vary, but is constant at a low level, and occasionally it can spike up to a more noticeable level. She states this feels like a \"pressure/pushing sensation\". She has cut out alcohol, coffee, spicy foods, acidic foods and symptoms have not improved. She does continue to have dairy. This can fade over time. She has tried tums or gas-ex. She is not sure if bowel movements affect this. She does not note association with eating. No nausea, vomiting, early satiety.     Bowel movements- once every other day usually, other days will have three bowel movements. Occasionally will have to strain. This can happen 1-2 times a month. When she goes she will have loose stools. No melena or hematochezia. She has not tried medications for bowel movements.     GERD has worsened in the last years. She mainly feels burning in her chest, can sometimes feel acid in the back of her throat. This is worsened at the end of the day. She has not been able to identify a food trigger. She is " on PPI and has been on increased dose- but then was prescribed dose to wean down and is planning to go down to 20 mg a day. Very rarely would she have symptoms in the middle of this night.     Previous workup:   Normal hepatic panel, h pylori, RUQ US     ROS:    No fevers or chills  No weight loss  No blurry vision, double vision or change in vision  No sore throat  No lymphadenopathy  No headache, paraesthesias, or weakness in a limb  No shortness of breath or wheezing  No chest pain or pressure  No arthralgias or myalgias  No rashes or skin changes  No odynophagia or dysphagia  No BRBPR, hematochezia, melena  No dysuria, frequency or urgency  No hot/cold intolerance or polyria  No anxiety or depression    PERTINENT PAST MEDICAL HISTORY:  None     PREVIOUS SURGERIES:  Past Surgical History:   Procedure Laterality Date     BREAST SURGERY  March 2007    Fibroadenoma removed from left breast     HERNIA REPAIR  July 1998       PREVIOUS ENDOSCOPY:  None     ALLERGIES:     Allergies   Allergen Reactions     Azithromycin Hives     Penicillins Hives       PERTINENT MEDICATIONS:    Current Outpatient Medications:      fluticasone (FLONASE) 50 MCG/ACT nasal spray, Spray 1 spray into both nostrils daily as needed for rhinitis or allergies, Disp: , Rfl:      Levonorgestrel 13.5 MG IUD, , Disp: , Rfl:      pantoprazole (PROTONIX) 20 MG EC tablet, Take 1 tablet (20 mg) by mouth daily, Disp: 30 tablet, Rfl: 0     pantoprazole (PROTONIX) 40 MG EC tablet, Take 1 tablet (40 mg) by mouth daily (Patient taking differently: Take 40 mg by mouth 2 times daily ), Disp: 90 tablet, Rfl: 0   varely rare NSAIDs     SOCIAL HISTORY:  Rare alcohol, no drugs   Social History     Socioeconomic History     Marital status:      Spouse name: Not on file     Number of children: Not on file     Years of education: Not on file     Highest education level: Not on file   Occupational History     Not on file   Social Needs     Financial resource  strain: Not on file     Food insecurity     Worry: Not on file     Inability: Not on file     Transportation needs     Medical: Not on file     Non-medical: Not on file   Tobacco Use     Smoking status: Never Smoker     Smokeless tobacco: Never Used   Substance and Sexual Activity     Alcohol use: Not Currently     Comment: a few a month      Drug use: No     Sexual activity: Yes     Partners: Male     Birth control/protection: I.U.D.   Lifestyle     Physical activity     Days per week: Not on file     Minutes per session: Not on file     Stress: Not on file   Relationships     Social connections     Talks on phone: Not on file     Gets together: Not on file     Attends Judaism service: Not on file     Active member of club or organization: Not on file     Attends meetings of clubs or organizations: Not on file     Relationship status: Not on file     Intimate partner violence     Fear of current or ex partner: Not on file     Emotionally abused: Not on file     Physically abused: Not on file     Forced sexual activity: Not on file   Other Topics Concern     Parent/sibling w/ CABG, MI or angioplasty before 65F 55M? No   Social History Narrative     Not on file       FAMILY HISTORY:  FH of CRC: none   FH of IBD: none   Mother with similar symptoms   Family History   Problem Relation Age of Onset     Diabetes Father      Diabetes Paternal Grandfather      Diabetes Paternal Grandmother      Hypertension Mother      Ovarian cysts Mother      Hypertension Maternal Grandfather      Hyperlipidemia Maternal Grandfather      Other Cancer Maternal Grandfather         Lung cancer       Past/family/social history reviewed and no changes    PHYSICAL EXAMINATION:  Constitutional: aaox3, cooperative, pleasant, not dyspneic/diaphoretic, no acute distress  Vitals reviewed: There were no vitals taken for this visit.  Wt:   Wt Readings from Last 2 Encounters:   02/09/21 95.7 kg (211 lb)   01/19/21 95.3 kg (210 lb)   General  "appearance: Healthy appearing adult, in no acute distress  Eyes: Sclera anicteric  Ears, nose, mouth and throat: No obvious external lesions of ears and nose, Hearing intact  Neck: Symmetric, No obvious external lesions  Respiratory: Normal respiration, no use of accessory muscles   Skin: No rashes or jaundice   Psychiatric: Oriented to person, place and time, Appropriate mood and affect.     PERTINENT STUDIES:    Office Visit on 01/19/2021   Component Date Value Ref Range Status     Bilirubin Direct 01/19/2021 <0.1  0.0 - 0.2 mg/dL Final     Bilirubin Total 01/19/2021 0.4  0.2 - 1.3 mg/dL Final     Albumin 01/19/2021 3.8  3.4 - 5.0 g/dL Final     Protein Total 01/19/2021 7.7  6.8 - 8.8 g/dL Final     Alkaline Phosphatase 01/19/2021 59  40 - 150 U/L Final     ALT 01/19/2021 18  0 - 50 U/L Final     AST 01/19/2021 9  0 - 45 U/L Final       Raquel Van is a 31 year old female who is being evaluated via a billable video visit.      The patient has been notified of following:     \"This video visit will be conducted via a call between you and your physician/provider. We have found that certain health care needs can be provided without the need for an in-person physical exam.  This service lets us provide the care you need with a video conversation.  If a prescription is necessary we can send it directly to your pharmacy.  If lab work is needed we can place an order for that and you can then stop by our lab to have the test done at a later time.    If during the course of the call the physician/provider feels a video visit is not appropriate, you will not be charged for this service.\"     Patient confirmed that they are in [place] for today's visit Yes    Video-Visit Details  Type of service:  Video Visit    Video Start Time: 4:52 PM  Video End Time:  5:22 PM    Originating Location (pt. Location): Home    Distant Location (provider location):  [facility]STROENTEROLOG[facility] [place]     Platform used: " Nicole    Again, thank you for allowing me to participate in the care of your patient.      Sincerely,    Chelo Landa PA-C

## 2021-04-28 ENCOUNTER — TELEPHONE (OUTPATIENT)
Dept: GASTROENTEROLOGY | Facility: OUTPATIENT CENTER | Age: 31
End: 2021-04-28

## 2021-04-28 DIAGNOSIS — Z11.59 ENCOUNTER FOR SCREENING FOR OTHER VIRAL DISEASES: ICD-10-CM

## 2021-04-28 NOTE — TELEPHONE ENCOUNTER
Screening Questions  1. What insurance is in the chart? [employer]    2.  Ordering/Referring Provider: Bernarda Nunez    3. BMI:5 6 /211=34.1    4. Are you on daily home oxygen? N    5. Do you have a history of difficult airway? N    6. Have you had a heart, lung, or liver transplant? N    7. Have you had a stroke or Transient ischemic atttack (TIA) within 3 months? N    8. In the past year, have you had any heart related issues including cardiomyopathy or a MI within 6 months, that required cardiac stenting or other implantable devices? N    9. What type of implantable device do you have (any pacemaker, defib, LVAD)? N    10. Do you take nitroglycerin? If yes, how often? N    11. Are you currently taking any blood thinners?N    12. Are you a diabetic? N    13. (Females) Are you currently pregnant? N  If yes, how many weeks?    14. Have you had a procedure in the past that was difficult to tolerate with conscious sedation? Any allergies to Fentanyl or Versed Y; WHEN PT WAS 8 ; came out really hard of a full anesthesia hernia repair. Late procedures nothing has happened recently.     15. Are you taking any scheduled prescription narcotics more than once daily? n    16. Do you have any chemical dependencies such as alcohol, street drugs, or methadone? n    17. Do you have any history of post-traumatic stress syndrome or mental health issues? n    18. Do you transfer independently? y    19.  Do you have any issues with constipation? Sometimes; like 1xweek    Scheduling Details    Procedure Scheduled: EGD  Provider/Surgeon: LEVENTHAL  Date of Procedure: 5/5  Location: [facility]  Caller (Please ask for phone number if not scheduled by patient): PATIENT      Sedation Type: CS  Conscious Sedation- Needs  for 6 hours after the procedure  MAC/General-Needs  for 24 hours after procedure    Pre-op Required at [facility] and OR for  MAC sedation:   (if yes advise patient they will need a pre-op prior to procedure)       Is patient on blood thinners? -N (If yes- inform patient to follow up with PCP or provider for follow up instructions)     Informed patient they will need an adult  Y  Cannot take any type of public or medical transportation alone    Informed Patient of COVID Test Requirement Y-SCHED    Preferred Pharmacy for Pre Prescription N/A    Confirmed Nurse will call to complete assessment     Additional comments:

## 2021-05-04 DIAGNOSIS — Z11.59 ENCOUNTER FOR SCREENING FOR OTHER VIRAL DISEASES: ICD-10-CM

## 2021-05-04 LAB
LABORATORY COMMENT REPORT: NORMAL
SARS-COV-2 RNA RESP QL NAA+PROBE: NEGATIVE
SARS-COV-2 RNA RESP QL NAA+PROBE: NORMAL
SPECIMEN SOURCE: NORMAL
SPECIMEN SOURCE: NORMAL

## 2021-05-04 PROCEDURE — U0005 INFEC AGEN DETEC AMPLI PROBE: HCPCS | Mod: 90 | Performed by: PATHOLOGY

## 2021-05-04 PROCEDURE — U0003 INFECTIOUS AGENT DETECTION BY NUCLEIC ACID (DNA OR RNA); SEVERE ACUTE RESPIRATORY SYNDROME CORONAVIRUS 2 (SARS-COV-2) (CORONAVIRUS DISEASE [COVID-19]), AMPLIFIED PROBE TECHNIQUE, MAKING USE OF HIGH THROUGHPUT TECHNOLOGIES AS DESCRIBED BY CMS-2020-01-R: HCPCS | Mod: 90 | Performed by: PATHOLOGY

## 2021-05-04 PROCEDURE — 99000 SPECIMEN HANDLING OFFICE-LAB: CPT | Performed by: PATHOLOGY

## 2021-05-05 ENCOUNTER — HOSPITAL ENCOUNTER (OUTPATIENT)
Facility: CLINIC | Age: 31
Discharge: HOME | End: 2021-05-05
Attending: INTERNAL MEDICINE | Admitting: INTERNAL MEDICINE
Payer: COMMERCIAL

## 2021-05-05 VITALS
BODY MASS INDEX: 33.05 KG/M2 | OXYGEN SATURATION: 95 % | DIASTOLIC BLOOD PRESSURE: 98 MMHG | TEMPERATURE: 98.5 F | SYSTOLIC BLOOD PRESSURE: 154 MMHG | HEART RATE: 69 BPM | RESPIRATION RATE: 17 BRPM | HEIGHT: 67 IN

## 2021-05-05 LAB
HCG SERPL QL: NEGATIVE
UPPER GI ENDOSCOPY: NORMAL

## 2021-05-05 PROCEDURE — G0500 MOD SEDAT ENDO SERVICE >5YRS: HCPCS | Performed by: INTERNAL MEDICINE

## 2021-05-05 PROCEDURE — 43239 EGD BIOPSY SINGLE/MULTIPLE: CPT | Performed by: INTERNAL MEDICINE

## 2021-05-05 PROCEDURE — 88305 TISSUE EXAM BY PATHOLOGIST: CPT | Mod: TC | Performed by: INTERNAL MEDICINE

## 2021-05-05 PROCEDURE — 250N000011 HC RX IP 250 OP 636: Performed by: INTERNAL MEDICINE

## 2021-05-05 PROCEDURE — 36415 COLL VENOUS BLD VENIPUNCTURE: CPT | Performed by: INTERNAL MEDICINE

## 2021-05-05 PROCEDURE — 88305 TISSUE EXAM BY PATHOLOGIST: CPT | Mod: 26 | Performed by: PATHOLOGY

## 2021-05-05 PROCEDURE — 250N000009 HC RX 250: Performed by: INTERNAL MEDICINE

## 2021-05-05 PROCEDURE — 84703 CHORIONIC GONADOTROPIN ASSAY: CPT | Performed by: INTERNAL MEDICINE

## 2021-05-05 RX ORDER — FENTANYL CITRATE 50 UG/ML
INJECTION, SOLUTION INTRAMUSCULAR; INTRAVENOUS PRN
Status: COMPLETED | OUTPATIENT
Start: 2021-05-05 | End: 2021-05-05

## 2021-05-05 RX ORDER — PROCHLORPERAZINE MALEATE 10 MG
10 TABLET ORAL EVERY 6 HOURS PRN
Status: DISCONTINUED | OUTPATIENT
Start: 2021-05-05 | End: 2021-05-05 | Stop reason: HOSPADM

## 2021-05-05 RX ORDER — ONDANSETRON 2 MG/ML
4 INJECTION INTRAMUSCULAR; INTRAVENOUS EVERY 6 HOURS PRN
Status: DISCONTINUED | OUTPATIENT
Start: 2021-05-05 | End: 2021-05-05 | Stop reason: HOSPADM

## 2021-05-05 RX ORDER — ONDANSETRON 4 MG/1
4 TABLET, ORALLY DISINTEGRATING ORAL EVERY 6 HOURS PRN
Status: DISCONTINUED | OUTPATIENT
Start: 2021-05-05 | End: 2021-05-05 | Stop reason: HOSPADM

## 2021-05-05 RX ORDER — LIDOCAINE 40 MG/G
CREAM TOPICAL
Status: DISCONTINUED | OUTPATIENT
Start: 2021-05-05 | End: 2021-05-05 | Stop reason: HOSPADM

## 2021-05-05 RX ORDER — FLUMAZENIL 0.1 MG/ML
0.2 INJECTION, SOLUTION INTRAVENOUS
Status: DISCONTINUED | OUTPATIENT
Start: 2021-05-05 | End: 2021-05-05 | Stop reason: HOSPADM

## 2021-05-05 RX ORDER — NALOXONE HYDROCHLORIDE 0.4 MG/ML
0.2 INJECTION, SOLUTION INTRAMUSCULAR; INTRAVENOUS; SUBCUTANEOUS
Status: DISCONTINUED | OUTPATIENT
Start: 2021-05-05 | End: 2021-05-05 | Stop reason: HOSPADM

## 2021-05-05 RX ORDER — NALOXONE HYDROCHLORIDE 0.4 MG/ML
0.4 INJECTION, SOLUTION INTRAMUSCULAR; INTRAVENOUS; SUBCUTANEOUS
Status: DISCONTINUED | OUTPATIENT
Start: 2021-05-05 | End: 2021-05-05 | Stop reason: HOSPADM

## 2021-05-05 RX ORDER — ONDANSETRON 2 MG/ML
4 INJECTION INTRAMUSCULAR; INTRAVENOUS
Status: DISCONTINUED | OUTPATIENT
Start: 2021-05-05 | End: 2021-05-05 | Stop reason: HOSPADM

## 2021-05-05 RX ADMIN — FENTANYL CITRATE 100 MCG: 50 INJECTION, SOLUTION INTRAMUSCULAR; INTRAVENOUS at 09:35

## 2021-05-05 RX ADMIN — MIDAZOLAM 1 MG: 1 INJECTION INTRAMUSCULAR; INTRAVENOUS at 09:37

## 2021-05-05 RX ADMIN — FENTANYL CITRATE 100 MCG: 50 INJECTION, SOLUTION INTRAMUSCULAR; INTRAVENOUS at 09:38

## 2021-05-05 RX ADMIN — MIDAZOLAM 2 MG: 1 INJECTION INTRAMUSCULAR; INTRAVENOUS at 09:35

## 2021-05-05 RX ADMIN — TOPICAL ANESTHETIC 1 SPRAY: 200 SPRAY DENTAL; PERIODONTAL at 09:36

## 2021-05-05 NOTE — OR NURSING
Patient had EGD with biopsies.  Tolerated procedure well under conscious sedation and 2 liters nasal cannula

## 2021-05-06 LAB — COPATH REPORT: NORMAL

## 2021-05-30 VITALS — WEIGHT: 229 LBS | HEIGHT: 67 IN | BODY MASS INDEX: 35.94 KG/M2

## 2021-06-25 NOTE — PROGRESS NOTES
Progress Notes by Jin Anderson at 2/7/2017 10:40 AM     Author: Jin Anderson Service: -- Author Type: Nurse Practitioner    Filed: 2/7/2017  4:09 PM Encounter Date: 2/7/2017 Status: Signed    : Jin Anderson [facility]    Date of Service: 2/7/2017  Primary Provider: No Primary Care Provider    Patient Care Team:  No Primary Care Provider as PCP - General     ______________________________________________________________________     Patient's Pharmacy:    [id number] - [place], [place] - [address]  [address]  [place] [address]  Phone: [phone] Fax: [phone]     Patient's Insurance:    Payor: BLUE CROSS / Plan: BLUE CROSS OUT OF STATE / Product Type: PPO/POS/FFS /      ______________________________________________________________________     Raquel Van is a 26 y.o. female who comes in today for:    Chief Complaint   Patient presents with   ? Toe Injury     Left little toe injury. Kicked the bed lastnight. Now has bruising and painful and very swollen.        Current Outpatient Prescriptions   Medication Sig   ? fluticasone (FLONASE) 50 mcg/actuation nasal spray 1 spray into each nostril daily.   ? levonorgestrel (BENNY) 14 mcg/24 hour (3 years) IUD by Intrauterine route.     ______________________________________________________________________     History of present illness:  Raquel Van is a 26-year-old female that presents today in clinic for left fifth toe pain.  She states that yesterday, she accidentally struck the corner of her bed frame with her left fifth toe during the process of catching her foot in her sweat pants as she was putting them on.  Today, she complains of moderate pain with wearing her winter boot, approximately 6/10, due to the pressure against it in the boot.  The fifth toe on her left foot is moderately ecchymotic on the inner aspect of the fifth toe and is swollen at the metatarsal joint.  She  "denies pain with walking.  She is able to wiggle her toes on her left foot without problem.  She denies numbness or tingling in her toes on her left foot.  She has not tried any treatments at home for this.    On review of systems, the patient has no fever, shortness of breath, chest pain, ankle or pain in the remainder of the left foot.  Positive for symptoms as noted in the HPI.  ______________________________________________________________________      Wt Readings from Last 3 Encounters:   02/07/17 (!) 229 lb (103.9 kg)   10/13/16 (!) 224 lb (101.6 kg)     BP Readings from Last 3 Encounters:   02/07/17 120/78   10/13/16 110/74   06/30/15 129/80      Visit Vitals   ? /78   ? Pulse 74   ? Ht 5' 7\" (1.702 m)   ? Wt (!) 229 lb (103.9 kg)   ? BMI 35.87 kg/m2        Physical Exam:  General Appearance: Alert, cooperative, no distress, appears stated age  Lungs: Clear to auscultation bilaterally, respirations unlabored  Heart: Regular rate and rhythm, S1 and S2 normal, no murmur, rub, or gallop,  Musculoskeletal: Normal range of motion.  Extremities: Extremities normal, trauma to left 5th phalange swelling and ecchymosis.   Neurologic: She is alert. She has normal reflexes.   Psychiatric: She has a normal mood and affect.     ______________________________________________________________________     Assessment:    1. Pain of fifth toe - imaging reviewed personally and appears to be negative for fracture. Results pending for formal radiology interpretation.      ______________________________________________________________________     Plan:  Patient Instructions   1. Ibuprofen 600 mg three times daily as needed for pain.  2. Check Left 5th Toe Xray today      Jin Anderson, [facility]  Internal Medicine  [facility] [place]      Return if symptoms worsen or fail to improve.        "

## 2021-10-09 ENCOUNTER — HEALTH MAINTENANCE LETTER (OUTPATIENT)
Age: 31
End: 2021-10-09

## 2022-01-29 ENCOUNTER — HEALTH MAINTENANCE LETTER (OUTPATIENT)
Age: 32
End: 2022-01-29

## 2022-02-07 ENCOUNTER — VIRTUAL VISIT (OUTPATIENT)
Dept: INTERNAL MEDICINE | Facility: CLINIC | Age: 32
End: 2022-02-07
Payer: COMMERCIAL

## 2022-02-07 DIAGNOSIS — J06.9 UPPER RESPIRATORY TRACT INFECTION, UNSPECIFIED TYPE: ICD-10-CM

## 2022-02-07 DIAGNOSIS — H92.01 EARACHE, RIGHT: Primary | ICD-10-CM

## 2022-02-07 PROCEDURE — 99213 OFFICE O/P EST LOW 20 MIN: CPT | Mod: GT | Performed by: INTERNAL MEDICINE

## 2022-02-07 RX ORDER — CIPROFLOXACIN 500 MG/1
500 TABLET, FILM COATED ORAL 2 TIMES DAILY
Qty: 14 TABLET | Refills: 0 | Status: SHIPPED | OUTPATIENT
Start: 2022-02-07 | End: 2022-02-14

## 2022-02-07 NOTE — PATIENT INSTRUCTIONS
Restart Flonase.    Start Cipro antibiotic.  Probiotic or yogurt recommended while on antibiotics.    If your ear pain is not resolved by the end of the antibiotic treatment, or for any worsening symptoms or changing symptoms, get evaluated in clinic

## 2022-02-07 NOTE — PROGRESS NOTES
Raquel is a 31 year old who is being evaluated via a billable video visit.      How would you like to obtain your AVS? MyChart  If the video visit is dropped, the invitation should be resent by: Send to e-mail at: [email or web address]  Will anyone else be joining your video visit? No      Video Start Time: 3:24 PM      [facility] Follow Up Note    Raquel Van   31 year old female    Date of Visit: 2/7/2022    Chief Complaint   Patient presents with     Pharyngitis     Sore throat x 5 days, ear pain x 3 days(feels like previous ear infections)     Subjective  Otherwise healthy 31-year-old female.  She does have a history of ear infections in the past, but is been over a year.  She does remember getting Cipro in the past which works well for her.  She has a penicillin and azithromycin allergy.  She has used Flonase in the past but not currently.  No history of asthma.    She was on vacation last week and developed a sore throat which is now better.  3 days ago she developed right ear pain which has been increasing but not severe.  No recurrent vertigo.  No shortness of breath or chest pain.  No GI symptoms or rash.    She did get her COVID booster in December.  No fever.  She did not have a prominent cough, just slight cough which is now improving    PMHx:  No past medical history on file.  PSHx:    Past Surgical History:   Procedure Laterality Date     BRAIN SURGERY Left     fibroadenoma     BREAST SURGERY  March 2007    Fibroadenoma removed from left breast     ESOPHAGOSCOPY, GASTROSCOPY, DUODENOSCOPY (EGD), COMBINED N/A 5/5/2021    Procedure: ESOPHAGOGASTRODUODENOSCOPY, WITH BIOPSY;  Surgeon: Leventhal, Thomas Michael, MD;  Location:  GI     HERNIA REPAIR  July 1998     HERNIA REPAIR Left     age 8     Immunizations:   Immunization History   Administered Date(s) Administered     COVID-19,PF,Moderna 04/19/2021, 05/17/2021     COVID-19,PF,Pfizer (12+ Yrs) 12/24/2021     HPV Quadrivalent 01/07/2009,  04/13/2009, 08/18/2009     Influenza Quad, Recombinant, pf(RIV4) (Flublok) 03/26/2019     Influenza Vaccine IM > 6 months Valent IIV4 (Alfuria,Fluzone) 10/27/2020     Meningococcal (Menactra ) 01/07/2009     TDAP Vaccine (Adacel) 03/26/2019       ROS A comprehensive review of systems was performed and was otherwise negative    Medications, allergies, and problem list were reviewed and updated    Exam  There were no vitals taken for this visit.  He does not appear acutely ill.  No conjunctivitis.  No dyspnea.  Unable to examine earlobe.    Assessment/Plan  1. Earache, right  Description of a right earache as a complication of most likely a viral URI while she was traveling.  I did give her warnings on Cipro including diarrhea, allergic reaction, tendon rupture risk, heart arrhythmia risk and other risks.  She accepts these risks and wishes to proceed.  She was told to come to clinic for further evaluation if not responding to this therapy.  Can restart Flonase.  Should be better in 1 week otherwise follow-up with her primary care provider  - ciprofloxacin (CIPRO) 500 MG tablet; Take 1 tablet (500 mg) by mouth 2 times daily for 7 days  Dispense: 14 tablet; Refill: 0    2. Upper respiratory tract infection, unspecified type  URI, but with no fever or shortness of breath, fully immunized, is not suggestive for Covid illness.      Return in about 1 week (around 2/14/2022) for Reevaluate if not better.   Instructions given to patient:  Restart Flonase.    Start Cipro antibiotic.  Probiotic or yogurt recommended while on antibiotics.    If your ear pain is not resolved by the end of the antibiotic treatment, or for any worsening symptoms or changing symptoms, get evaluated in clinic    Robe Bejarano MD, MD        Current Outpatient Medications   Medication Sig Dispense Refill     ciprofloxacin (CIPRO) 500 MG tablet Take 1 tablet (500 mg) by mouth 2 times daily for 7 days 14 tablet 0     esomeprazole (NEXIUM) 40 MG   capsule Take 1 capsule (40 mg) by mouth every morning (before breakfast) Take 30-60 minutes before eating. 90 capsule 1     Levonorgestrel 13.5 MG IUD        fluticasone (FLONASE) 50 MCG/ACT nasal spray Spray 1 spray into both nostrils daily as needed for rhinitis or allergies (Patient not taking: Reported on 2/7/2022)       Allergies   Allergen Reactions     Azithromycin Hives     Penicillins Hives     Social History     Tobacco Use     Smoking status: Never Smoker     Smokeless tobacco: Never Used   Substance Use Topics     Alcohol use: Not Currently     Comment: a few a month      Drug use: No           Video-Visit Details    Type of service:  Video Visit    Video End Time:3:36 PM    Originating Location (pt. Location): Home    Distant Location (provider location):  [facility]     Platform used for Video Visit: Nicole

## 2022-03-18 ENCOUNTER — MYC MEDICAL ADVICE (OUTPATIENT)
Dept: GASTROENTEROLOGY | Facility: CLINIC | Age: 32
End: 2022-03-18
Payer: COMMERCIAL

## 2022-03-18 DIAGNOSIS — K21.9 GASTROESOPHAGEAL REFLUX DISEASE WITHOUT ESOPHAGITIS: Primary | ICD-10-CM

## 2022-03-18 RX ORDER — ESOMEPRAZOLE MAGNESIUM 40 MG/1
40 CAPSULE, DELAYED RELEASE ORAL
Qty: 90 CAPSULE | Refills: 1 | Status: SHIPPED | OUTPATIENT
Start: 2022-03-18 | End: 2022-09-15

## 2022-06-22 ASSESSMENT — ANXIETY QUESTIONNAIRES
5. BEING SO RESTLESS THAT IT IS HARD TO SIT STILL: NOT AT ALL
GAD7 TOTAL SCORE: 12
7. FEELING AFRAID AS IF SOMETHING AWFUL MIGHT HAPPEN: NOT AT ALL
8. IF YOU CHECKED OFF ANY PROBLEMS, HOW DIFFICULT HAVE THESE MADE IT FOR YOU TO DO YOUR WORK, TAKE CARE OF THINGS AT HOME, OR GET ALONG WITH OTHER PEOPLE?: VERY DIFFICULT
GAD7 TOTAL SCORE: 12
1. FEELING NERVOUS, ANXIOUS, OR ON EDGE: NEARLY EVERY DAY
6. BECOMING EASILY ANNOYED OR IRRITABLE: MORE THAN HALF THE DAYS
4. TROUBLE RELAXING: SEVERAL DAYS
7. FEELING AFRAID AS IF SOMETHING AWFUL MIGHT HAPPEN: NOT AT ALL
GAD7 TOTAL SCORE: 12
3. WORRYING TOO MUCH ABOUT DIFFERENT THINGS: NEARLY EVERY DAY
2. NOT BEING ABLE TO STOP OR CONTROL WORRYING: NEARLY EVERY DAY

## 2022-06-28 ENCOUNTER — VIRTUAL VISIT (OUTPATIENT)
Dept: PSYCHOLOGY | Facility: CLINIC | Age: 32
End: 2022-06-28
Attending: NURSE PRACTITIONER
Payer: COMMERCIAL

## 2022-06-28 DIAGNOSIS — F88 DEVELOPMENTAL MENTAL DISORDER: Primary | ICD-10-CM

## 2022-06-28 PROCEDURE — 90834 PSYTX W PT 45 MINUTES: CPT | Mod: GT | Performed by: PSYCHOLOGIST

## 2022-06-28 ASSESSMENT — COLUMBIA-SUICIDE SEVERITY RATING SCALE - C-SSRS
ATTEMPT LIFETIME: NO
6. HAVE YOU EVER DONE ANYTHING, STARTED TO DO ANYTHING, OR PREPARED TO DO ANYTHING TO END YOUR LIFE?: NO
1. HAVE YOU WISHED YOU WERE DEAD OR WISHED YOU COULD GO TO SLEEP AND NOT WAKE UP?: NO
TOTAL  NUMBER OF INTERRUPTED ATTEMPTS LIFETIME: NO
2. HAVE YOU ACTUALLY HAD ANY THOUGHTS OF KILLING YOURSELF?: NO
TOTAL  NUMBER OF ABORTED OR SELF INTERRUPTED ATTEMPTS LIFETIME: NO

## 2022-06-28 ASSESSMENT — PATIENT HEALTH QUESTIONNAIRE - PHQ9: SUM OF ALL RESPONSES TO PHQ QUESTIONS 1-9: 5

## 2022-06-28 NOTE — PROGRESS NOTES
[facility]   Mental Health & Addiction Services     Progress Note - Initial Visit    Client Name:  Raquel Van Date: 2022         Service Type: Individual     Visit Start Time: 2:00pm  Visit End Time: 2:51pm    Visit #: 1    Attendees: Client attended alone    Service Modality:  Video Visit:      Provider verified identity through the following two step process.  Patient provided:  Patient photo and Patient     Telemedicine Visit: The patient's condition can be safely assessed and treated via synchronous audio and visual telemedicine encounter.      Reason for Telemedicine Visit: Services only offered telehealth    Originating Site (Patient Location): Patient's home    Distant Site (Provider Location): Provider Remote Setting- Home Office    Consent:  The patient/guardian has verbally consented to: the potential risks and benefits of telemedicine (video visit) versus in person care; bill my insurance or make self-payment for services provided; and responsibility for payment of non-covered services.     Patient would like the video invitation sent by:  Send to e-mail at: [email or web address]    Mode of Communication:  Video Conference via well     As the provider I attest to compliance with applicable laws and regulations related to telemedicine.       DATA:  Extended Session (53+ minutes): No  Interactive Complexity: No   Crisis: No     Presenting Concerns/Current Stressors:   Patient presented to session to initiate the ADHD evaluation process.       PHQ 2022   PHQ-9 Total Score 5   Q9: Thoughts of better off dead/self-harm past 2 weeks Not at all        JYOTHI-7 SCORE 2022   Total Score 12 (moderate anxiety)   Total Score 12       ASSESSMENT:  Mental Status Assessment:  Appearance:   Appropriate   Eye Contact:   Good   Psychomotor Behavior: Normal   Attitude:   Cooperative   Orientation:   All  Speech   Rate / Production: Normal/ Responsive   Volume:  Normal    Mood:    Normal  Affect:    Appropriate   Thought Content:  Clear   Thought Form:  Logical   Insight:    Good       Safety Issues and Plan for Safety and Risk Management:   [place] Suicide Severity Rating Scale (Lifetime/Recent)  [place] Suicide Severity Rating (Lifetime/Recent) 6/28/2022   1. Wish to be Dead (Lifetime) 0   2. Non-Specific Active Suicidal Thoughts (Lifetime) 0   Actual Attempt (Lifetime) 0   Has subject engaged in non-suicidal self-injurious behavior? (Lifetime) 0   Interrupted Attempts (Lifetime) 0   Aborted or Self-Interrupted Attempt (Lifetime) 0   Preparatory Acts or Behavior (Lifetime) 0   Calculated C-SSRS Risk Score (Lifetime/Recent) No Risk Indicated     Patient denies current fears or concerns for personal safety.  Patient denies current or recent suicidal ideation or behaviors.  Patient denies current or recent homicidal ideation or behaviors.  Patient denies current or recent self injurious behavior or ideation.  Patient denies other safety concerns.  Recommended that patient call 911 or go to the local ED should there be a change in any of these risk factors.   Patient reports there are no firearms in the house.    Diagnostic Criteria:  F88:  A. A persistent pattern of inattention and/or hyperactivity-impulsivity that interferes with functioning or development, as characterized by (1) and/or (2):   1. Six or more inattention symptoms that have persisted for at least 6 months to a degree that is inconsistent with developmental level and that negatively impacts directly on social and academic/occupational activities.   2. Six or more hyperactivity and impulsivity symptoms that have persisted for at least 6 months to a degree that is inconsistent with developmental level and that negatively impacts directly on social and academic/occupational activities.  B. Several symptoms (inattentive or hyperactive/impulsive) were present before the age of 12 years.  C. Several symptoms (inattentive  or hyperactive/impulsive) present in ?2 settings (eg, at home, school, or work; with friends or relatives; in other activities).  D. There is clear evidence that the symptoms interfere with or reduce the quality of social, academic, or occupational functioning.  E. Symptoms do not occur exclusively during the course of schizophrenia or another psychotic disorder, and are not better explained by another mental disorder (eg, mood disorder, anxiety disorder, dissociative disorder, personality disorder, substance intoxication, or withdrawal).    F41.1:  A. Excessive anxiety and worry, occurring more days than not for at least 6 months about a number of events or activities.   B. The individual finds it difficult to control the worry.  C. The anxiety and worry are associated with 3 or more of 6 symptoms.  D. The anxiety, worry, or physical symptoms cause clinically significant distress or impairment in social, occupational, or other important areas of functioning.  E. The disturbance is not attributable to the physiological effects of a substance (e.g., a drug of abuse, a medication) or another medical condition (e.g., hyperthyroidism).  F. The disturbance is not better explained by another mental disorder (e.g., anxiety or worry about having panic attacks in panic disorder, negative evaluation in social anxiety disorder [social phobia], contamination or other obsessions in obsessive-compulsive disorder, separation from attachment figures in separation anxiety disorder, reminders of traumatic events in posttraumatic stress disorder, gaining weight in anorexia nervosa, physical complaints in somatic symptom disorder, perceived appearance flaws in body dysmorphic disorder, having a serious illness in illness anxiety disorder, or the content of delusional beliefs in schizophrenia or delusional disorder).      DSM5 Diagnoses: (Sustained by DSM5 Criteria Listed Above)  Diagnoses:   1. Developmental mental disorder    Rule out  depression  Rule out anxiety  Psychosocial & Contextual Factors: employed full-time, social support  WHODAS 2.0 (12 item):   WHODAS 2.0 Total Score 6/22/2022   Total Score 28   Total Score MyChart 28       Intervention:              Reviewed symptoms and history of presenting concern. Patient endorsed symptoms consistent with ADHD.  Patient describes some symptoms consistent with depression and anxiety but indicated she has never been diagnosed with a mental health disorder in the past.  These symptoms need further assessment.  Patient also recalled trauma event but denied symptoms consistent with PTSD.  Patient denied symptoms associated with katie, panic, OCD and perceptual difficulties. Unable to complete diagnostic intake, will be completed in next session.   CBT: socratic questioning, positive reinforcement  EFT: empathetic attunement, emotion checking  MI: open ended questions, affirmations, reflections        Attendance Agreement:  Client has not signed the attendance agreement. Discussed expectations at beginning of this first session and patient agreed.       PLAN:  Provider will continue Diagnostic Assessment in next session. Patient will complete Erin questionnaires  and CNS Vital Signs prior to next session (7/6/2022).    Patient meets the following risk assessment and triage: Patient denied any current/recent/lifetime history of suicidal ideation and/or behaviors.  No safety plan indicated at this time.     Medical necessity criteria is warranted in order to: Measure a psychological disorder and its severity and functional impairment to determine psychiatric diagnosis when a mental illness is suspected, or to achieve a differential diagnosis from a range of medical/psychological disorders that present with similar constellations of symptoms (e.g., determination and measurement of anxiety severity and impact in the presence of ongoing asthma or heart disease), Perform symptom measurement to  objectively measure treatment effectiveness and/or determine the need to refer for pharmacological treatment or other medical evaluation (e.g., based on severity and chronicity of symptoms) and Evaluate primary symptoms of impaired attention and concentration that can occur in many neurological and psychiatric conditions    Medical necessity for psychological assessment is warranted as a result of the following: (1) A specific clinical question is posed that relates to the condition/symptoms being addressed (2) The question cannot be adequately addressed by clinical interview and/or behavioral observation (3) Results of psychological testing are reasonably expected to provide an answer to the query (4) It is reasonably expected that the testing will provide information leading to a clearer diagnosis and/or guide treatment planning with an expectation of improved clinical outcome.    I acknowledge that, based upon current clinical information, the patient and I have reviewed and discussed issues pertaining to the purpose of therapy/testing, potential therapeutic goals, procedures, risks and benefits, and estimated duration of therapy/testing. Issues pertaining to fees/insurance and confidentiality were also addressed with the patient, who indicated understanding and elected to continue with appointments. I will not be providing any experimental procedures and, if we agree that a change in clinical procedure would be more beneficial, I will obtain specific consent for that procedure or refer you to another provider who has expertise in that area.       Sobia Hernandez PsyD,   Clinical Psychologist

## 2022-07-06 ENCOUNTER — VIRTUAL VISIT (OUTPATIENT)
Dept: PSYCHOLOGY | Facility: CLINIC | Age: 32
End: 2022-07-06
Payer: COMMERCIAL

## 2022-07-06 DIAGNOSIS — F88 DEVELOPMENTAL MENTAL DISORDER: Primary | ICD-10-CM

## 2022-07-06 PROCEDURE — 90791 PSYCH DIAGNOSTIC EVALUATION: CPT | Mod: GT | Performed by: PSYCHOLOGIST

## 2022-07-06 ASSESSMENT — ANXIETY QUESTIONNAIRES
8. IF YOU CHECKED OFF ANY PROBLEMS, HOW DIFFICULT HAVE THESE MADE IT FOR YOU TO DO YOUR WORK, TAKE CARE OF THINGS AT HOME, OR GET ALONG WITH OTHER PEOPLE?: SOMEWHAT DIFFICULT
7. FEELING AFRAID AS IF SOMETHING AWFUL MIGHT HAPPEN: NOT AT ALL
7. FEELING AFRAID AS IF SOMETHING AWFUL MIGHT HAPPEN: NOT AT ALL
4. TROUBLE RELAXING: SEVERAL DAYS
6. BECOMING EASILY ANNOYED OR IRRITABLE: SEVERAL DAYS
2. NOT BEING ABLE TO STOP OR CONTROL WORRYING: SEVERAL DAYS
3. WORRYING TOO MUCH ABOUT DIFFERENT THINGS: SEVERAL DAYS
GAD7 TOTAL SCORE: 7
GAD7 TOTAL SCORE: 7
1. FEELING NERVOUS, ANXIOUS, OR ON EDGE: MORE THAN HALF THE DAYS
GAD7 TOTAL SCORE: 7
5. BEING SO RESTLESS THAT IT IS HARD TO SIT STILL: SEVERAL DAYS
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
## 2022-07-20 ENCOUNTER — VIRTUAL VISIT (OUTPATIENT)
Dept: PSYCHOLOGY | Facility: CLINIC | Age: 32
End: 2022-07-20
Payer: COMMERCIAL

## 2022-07-20 DIAGNOSIS — F90.0 ATTENTION DEFICIT HYPERACTIVITY DISORDER, INATTENTIVE TYPE, MODERATE: Primary | ICD-10-CM

## 2022-07-20 DIAGNOSIS — F41.1 GENERALIZED ANXIETY DISORDER: ICD-10-CM

## 2022-07-20 PROCEDURE — 96130 PSYCL TST EVAL PHYS/QHP 1ST: CPT | Mod: GT | Performed by: PSYCHOLOGIST

## 2022-07-20 PROCEDURE — 96131 PSYCL TST EVAL PHYS/QHP EA: CPT | Mod: GT | Performed by: PSYCHOLOGIST

## 2022-07-20 NOTE — PATIENT INSTRUCTIONS
Coping with Attention-Deficit/Hyperactivity Disorder (ADHD)    If you have ADHD, it can be hard to sit still, pay attention or control impulsive behavior. ADHD can cause problems at home, at school, at work and in social settings. But you can learn ways to control your symptoms. Below are some ideas for coping with the most common ADHD problems.     Memory, Focus, and Managing Time  Be mindful of time. Use a watch, phone, timer, alarm, PDA or computer--anything that keeps accurate time that you can see and hear at all times. Set more than one alarm to remind you how much time you have left for a task. Give yourself more time than you think you need. For important appointments or deadlines, set reminder alarms hours or days ahead of time.   Use a day planner. A day planner can help you manage time and remember responsibilities. Learning to use a planner is just like learning to use any tool--practice makes perfect.   Use lists. Lists and notes can help you keep track of regular tasks, projects, deadlines and appointments. If you decide to use a daily planner, keep all lists and notes inside of it. Use color codes for tasks so you know which ones are the most important.  Learn to say no and set boundaries. Do not take on too many projects or social engagements. Overbooking yourself can be overwhelming and can lead to missed commitments.  Repeat out loud the instructions you have been given. This will help you remember, as well as let others correct you if needed.    Organization  Create space. Ask yourself what you need on a daily basis. Then, find storage bins or closets for things you don t need every day. Have specific areas for things like keys, bills and other items that are easy to misplace. Throw away or donate things you don t need.   Deal with it now. You can avoid forgetfulness, clutter and procrastination by doing things right away, not some time in the future. This includes filing papers, cleaning up  messes, opening and responding to mail and returning phone calls.  Set up a filing system. Use dividers or separate file folders for different types of documents, such as medical records, receipts and pay stubs. Label and color-code your files so that you can quickly find what you need.   Break larger tasks into small, manageable pieces. Write down the steps needed to complete the task. Follow each step in order until the task is done. If needed, take small, timed breaks and return to finish the task.  Organize your work space. Use lists or planners to organize your day. Remove clutter from your desk. Label any storage bins. Reduce distraction as much as possible. Ideas include sitting facing the wall, closing your door or using a white noise machine.    Sitting Still  Move around as needed. Don t fight the urge to move around. If you need to fidget or stand up for a period of time to help maintain attention, then do so. But take care that you re not interrupting others. You may also find it helpful to squeeze a stress ball.  Be active in a useful way. Exercise can burn off extra energy, relieve stress, boost your mood and calm your mind. Meditation, yoga or alonso chi can help you better control your attention and impulses.  Stay healthy. Get enough sleep. Avoid caffeine late in the day. Exercise. Create a relaxing bedtime routine. Stick to a schedule or daily routine. Eat small meals throughout the day. Avoid sugar, eat fewer carbohydrates and eat more protein.     Close Relationships  Talk to your loved ones about ADHD. There are many resources available that can help your loved one understand ADHD. See the Resources section below.  Divide daily tasks based on each person s strengths. For example, if you have trouble with organization, you may not want to do financial planning tasks.  If you have trouble with focus, develop a sign that others in your life can use as a gentle reminder to pay attention. The sign  should be small but meaningful to you and the other person.  Improve communication. Use a dry erase board in a common area to help the whole family stay in touch better. Keep regular to-do lists and compare scheduled activities every day. Writing notes to each other is also very helpful.  Be an active listener and try not to interrupt. If you find your mind wandering when others are talking, mentally repeat their words so you can follow the conversation. Ask questions and ask people to repeat what they said if needed. Pay close attention to body language.   Organize your thoughts. If you need to have a serious conversation, write a list of the points you would like to make or the important ideas you want to talk about. This can help you stay focused and remember what you need to say.  Think before speaking. It can be hard to control your impulses when you feel strongly about something. Before saying whatever pops into your head, stop to ask yourself  Will this be helpful?  or  Will this help me get what I want?   Take charge of your life. Work to accept that some things are harder for you. Make a plan to address these troubles and seek the support you need.    Online Resources  ADD Warehouse. http://www.Vook.com  ADHD and You.  Tips for Adults with ADHD.  http://www.adhdandyou.com/adhd-patient/adhd-tips-young-adult/  Attention Deficit Disorder Association. http://www.add.org  Attention Deficit Disorder Resources. http://www.addresources.org  Children and Adults with Attention-Deficit/Hyperactivity Disorder (SKYLAR). http://www.skylar.org/  Amber Reynolds.  33 Ways to Get Organized with Adult ADHD.  http://www.additudemag.com/adhd/article/729.html  National Resource Center on ADHD. http://www.vljh5qnbt.org/  Manju Ly.  Daily Living Tips for Adult ADHD.  http://www.medicinenet.com/living_tips_adult_adhd_pictures_slideshow/article.htm  Javi Rizzo and Angelina Barr.  Help for Adult ADD / ADHD.   http://www.helpguide.org/mental/adhd_add_adult_strategies.htm  You can also find many apps for your phone that can help you with common ADHD struggles    Book Resources  Vik Khan. ADHD in Adults. (2008).  Vik Khan. Taking Charge of Adult ADHD. (2010).  [place]. Delivered from Distraction. (2006).  [place]. Driven to Distraction. (2011).  Gardenia Palomino. ADD in the Workplace. (1997).  Gardenia Palomino. ADD-Friendly Ways to Organize Your Life. (2002).  Jeanine Gardner. The ADHD Effect on Marriage: Understand and Rebuild Your Relationship in Six Steps. (2010).  Luz Gautam and Radha Card. You Mean I m Not Lazy, Stupid or Crazy? (2006).  Elmer Emery. Understand Your Brain, Get More Done: The ADHD Executive Functions Workbook. (2012).    Support Groups  ADHD Adult Support Group  [address].  7:00 to 8:30 p.m., last Thursday of each month (except December).  Contact/RSVP: [email or web address]    ADHD Adult Support Group  [address].  Thursday 10:00 a.m. to 12:00 p.m. or 7:00 to 9:30 p.m.  Contact/RSVP: Mann Llamas. [phone] or [email or web address]     ADHD Adult Support Group for Spouses/Partners of adults with ADHD  [address].  Tuesday 12:00 to 2:00 p.m.   Contact/RSVP: Mann Llamas. [phone] or [email or web address]

## 2022-07-20 NOTE — PROGRESS NOTES
[facility]   Mental Health & Addiction Services     Progress Note - ADHD Feedback Session     Patient Name: Raquel Van  Date: 2022       Service Type:  Individual       Session Start Time: 5:00pm  Session End Time:  5:33pm    Session Length: 33 minutes    Session #: 3    Attendees: Patient attended alone    Service Modality: Video Visit:      Provider verified identity through the following two step process.  Patient provided:  Patient photo and Patient     Telemedicine Visit: The patient's condition can be safely assessed and treated via synchronous audio and visual telemedicine encounter.      Reason for Telemedicine Visit: Services only offered telehealth    Originating Site (Patient Location): Patient's home    Distant Site (Provider Location): Provider Remote Setting- Home Office    Consent:  The patient/guardian has verbally consented to: the potential risks and benefits of telemedicine (video visit) versus in person care; bill my insurance or make self-payment for services provided; and responsibility for payment of non-covered services.     Patient would like the video invitation sent by:  Send to e-mail at: [email or web address]    Mode of Communication:  Video Conference via well    As the provider I attest to compliance with applicable laws and regulations related to telemedicine.      PHQ 2022   PHQ-9 Total Score 5   Q9: Thoughts of better off dead/self-harm past 2 weeks Not at all       JYOTHI-7 SCORE 2022 2022   Total Score 12 (moderate anxiety) 7 (mild anxiety)   Total Score 12 7         DATA      Progress Since Last Session (Related to Symptoms / Goals / Homework):   Symptoms: Stable.    Homework: Completed.      Treatment Objective(s) Addressed in This Session:   Provided feedback on ADHD evaluation. Reviewed test results in depth. Plan of care and recommendations were discussed based on testing data. See full  report attached on secondary note in this encounter.     Intervention:   Provided feedback to patient regarding testing results, diagnoses, and treatment recommendations. Test results are consistent with an ADHD diagnosis. Symptoms are not better explained by an anxiety disorder. Personalized suggestions regarding symptoms were offered. Patient had the opportunity to ask questions; she expressed understanding.        ASSESSMENT: Current Emotional / Mental Status (status of significant symptoms):   Risk status (Self / Other harm or suicidal ideation)   Patient denies current fears or concerns for personal safety.   Patient denies current or recent suicidal ideation or behaviors.   Patient denies current or recent homicidal ideation or behaviors.   Patient denies current or recent self injurious behavior or ideation.   Patient denies other safety concerns.   Patient reports there has been no change in risk factors since their last session.     Patient reports there has been no change in protective factors since their last session.     Recommended that patient call 911 or go to the local ED should there be a change in any of these risk factors.     Appearance:   Appropriate    Eye Contact:   Good    Psychomotor Behavior: Normal    Attitude:   Cooperative    Orientation:   All   Speech    Rate / Production: Normal     Volume:  Normal    Mood:    Normal   Affect:    Appropriate    Thought Content:  Clear    Thought Form:  Coherent  Logical    Insight:    Good      Medication Review:   No current psychiatric medications prescribed     Medication Compliance:   NA     Changes in Health Issues:   None reported     Chemical Use Review:   Substance Use: Chemical use reviewed, no active concerns identified      Nicotine Use: No current tobacco use.      Diagnosis:  1. Attention deficit hyperactivity disorder, inattentive type, moderate    2. Generalized anxiety disorder        PLAN:   Recommendations are outlined in full  evaluation report (attached to this encounter).   Patient indicated understanding and will contact the clinic if there are further questions.    Report routed to referring provider.    Parts of this documentation may have been completed using dictation software. Potential errors may result and are unintentional.       Sobia Hernandez PsyD, LP  Clinical Psychologist         Psychological Testing Services Summary       Testing Evaluation Services Base: 54597  (first 60 mins) Add-on: 43580  (each addtl 60 mins)   Record Review and Clarify Referral Question   1:50pm-2:00pm on 6/28/2022 10 minutes   Clinical Decision Making/Battery Modification   1:45pm-2:00pm on 7/6/2022 15 minutes   Integration/Report Generation   6:00pm-7:00pm on 7/19/2022 ([facility])  7:00pm-7:30pm on 7/19/2022 ([facility])  7:30pm-8:30pm on 7/19/2022 (Final Report)   60 minutes  30 minutes  60 minutes   Interactive Feedback Session   5:00pm-5:33pm on 7/20/2022 33 minutes   Post-Service Work   5:33pm-5:48pm on 7/20/2022 15 minutes   Total Time: 223 minutes   Total Units: 1 3       Diagnoses:   F90.0 Attention-Deficit/Hyperactivity Disorder, inattentive presentation, moderate  F41.1 Generalized Anxiety Disorder

## 2022-07-20 NOTE — Clinical Note
Hello,  I wanted to let you know that I have completed the ADHD assessment with this patient.  As you will see the report, data do support an ADHD diagnosis.  I encouraged her to make an appointment with you soon to discuss medication options.  Please let me know if you have any questions or concerns!  Thanks!   Sobia

## 2022-07-20 NOTE — PROGRESS NOTES
"    Psychological Assessment Report    Patient: Raquel Van  YOB: 1990  MRN: [id number]  Date(s) of assessment: 6/28/2022 and 7/6/2022  Referral Source: [facility]   Reason for Referral: assessing reported deficits in executive functioning     IDENTIFYING INFORMATION AND BRIEF HISTORY OF PRESENTING PROBLEM: Raquel Van is a -year-old White woman who presented to the initial diagnostic intake appointments on 6/28/2022 and 7/6/2022 (see diagnostic intake dated 7/6/2022 for more detailed background information) due to primary concerns that previous depression and anxiety diagnosis were misdiagnoses. She described having problems with procrastination and feeling overwhelmed by tasks.     While in elementary school, the patient first recalled some problems with focus and indicated that she was often \"doing her own thing\" in class like reading or daydreaming.  She further described that she had problems blurting out information in 1st grade and her teacher at that time made comments about this.  The patient further recalled problems with procrastination and would procrastinate chores and homework.  If she was unable to complete homework in class and needed to take at home, it was unlikely to be finished and she would turn in items and complete.  The patient also reported problems with organization and described herself as messy.  However, the patient indicated that she was placed in accelerated programs in elementary school and academics were never a problem.  By middle school, she was 1-2 years ahead in English and requested to be placed in mainstream courses in order to be with her friends.  She completed 37 college credits through [employer] courses in 11th and 12th grade.  She then completed a bachelor's degree in philosophy and English literature at [employer].  The patient described significant problems with procrastination the entire way through college " and indicated that she handed in 2 papers after she graduated.  She purposely chose discussion based courses because she was able to perform better in this format.  Finally, the patient recalled spending most of her time doing what ever felt best in the moment such as not necessarily doing required readings, going to bars, and going shopping.  The patient currently works full-time in doing branding material for a company.  She is currently mandated to attend a meeting with her boss every other week in order to hold her accountable for completing tasks.  The patient indicated that despite this, she finds herself finishing tasks right before the meeting occurs, frequently runs late, difficulty focusing, and problems with forgetfulness (needs to write everything down that is assigned to her).  Previous positions reportedly provided negative feedback about her problems being on time and spending excessive amount of time distracted on the Internet.  Currently, the patient reported experiencing the following symptoms: Making careless mistakes/not paying attention to details (needs to force herself to go over things multiple times), difficulty sustaining her attention (needs subtitles to watch TV/movies, difficulty reading for work, zones out during meetings at work), does not listen when spoken to directly (zones out during conversations and  has reportedly noticed this), does not follow through with tasks, difficulty organizing (6 stacks of papers at work and home), avoids/dislikes tasks that require sustained mental effort (procrastinates frequently), loses things often, is easily distracted, is forgetful (Post-it notes everywhere), is fidgety, talks excessively, blurts out information, and often interrupts others.  The patient is seeking diagnostic clarification and updated treatment recommendations.    Mental Health History: The patient reported that she has been experiencing depression and anxiety symptoms for  many years, but has never been diagnosed. Depression symptoms are currently subclinical, but she feels anxious and nervous about a variety of things, including social situations. The patient denied a history of manic symptoms, social anxiety, phobic responses, symptoms of obsessive-compulsive disorder, trauma, and perceptual difficulties. The patient denied issues with sexual compulsivity, gambling, and disordered eating.    Developmental History: The patient reported that she believes that she is the product of a full-term pregnancy and there were no complications during her mother's pregnancy or birth. The patient reported that she believes she met all of her developmental milestones on time. She denied a history of head injuries, learning disorders, and special educational programming. The patient recalled academic strengths in english as well as weaknesses in math. She described growing up with her mother, father, and younger sister in the home. Parents remain . She stated that there were often fights in the house, which were loud because everyone tended to scream. But, relationships were close and childhood was  happy.      Chemical Dependence/Substance Abuse History: The patient denied a history of chemical or substance abuse.     SOURCES OF DATA/ASSESSMENT: Review of medical and psychiatric records, consideration of behavioral observations during the testing (if applicable), and the results of the psychological tests are all considered in the preparation of this psychological test report. It is important to note that test results comprise a hypothesis of the patient s mental health concerns and are not an independent or conclusive assessment. Test results are combined with the patient s available medical, psychological, behavioral data for an integrated interpretation and report. Due to virtual/remote administration, certain aspects of the assessment process were impacted, such as access to direct  patient observation, and maintaining an environment conducive to testing. As such, external factors have the potential to affect the validity of data collected.    TESTS ADMINISTERED:    CNS Vital Signs Neurocognitive Battery    Erin Adult ADHD Rating Scale-IV: Self and Other Reports (BAARS-IV)    Erin Functional Impairment Scale: Self and Other Reports (BFIS)    Erin Deficits in Executive Functioning Scale (BDEFS)    Generalized Anxiety Disorder Questionnaire (JYOTHI-7)    Patient Health Questionnaire- 9 (PHQ-9)    BEHAVIORAL OBSERVATIONS: The patient was pleasant and cooperative throughout all interview and explanation of testing process. The patient was oriented to person, place, and time. Mood was neutral. Eye contact was adequate and speech was at normal rate and rhythm. Motor activity was appropriate. Due to virtual/remote administration, direct patient observation was not possible during the testing process, and it is unknown if the patient was able to maintain an environment conducive to testing. As such, external factors have the potential to affect the validity of data collected.     TEST RESULTS: Test results comprise a hypothesis of the patient s mental health concerns and are not an independent or conclusive assessment. Test results are combined with the patient s available medical, psychological, behavioral, and observational data for an integrated interpretation and report.    CNS Vital Signs Neurocognitive Battery  The CNS Vital Signs Neurocognitive Battery is a remotely-administered assessment comprised of seven core subtests to individually measure the patient's verbal memory, visual memory, motor speed, psychomotor speed, reaction time, focus, ability to sustain attention and ability to adapt to changing rules and tasks.      Above average domain scores indicate a standard score (SS) greater than 109 or a Percentile Rank (NY) greater than 74, indicating a high functioning test subject.  Average is a SS  or NV 25-74, indicating normal function. Low Average is a SS 80-89 or NV 9-24 indicating a slight deficit or impairment. Below Average is a SS 70-79 or NV 2-8, indicating a moderate level of deficit or impairment. Very Low is a SS less than 70 or a NV less than 2, indicating a deficit and impairment.  Validity Indicator denotes a guideline for representing the possibility of an invalid test or domain score, and can be influenced by patient understanding, effort, or other conditions.    The patient s results are detailed below:    Domain Standard Score Percentile Description Validity   Neurocognitive Index 91 27 Average Yes   Composite Memory Measure 123 94 Above Average Yes   Verbal Memory 116 86 Above Average Yes   Visual Memory 123 94 Above Average Yes   Psychomotor Speed 109 73 Average Yes   Reaction Time 108 70 Average Yes   Complex Attention 43 1 Very Low Yes   Cognitive Flexibility 73 4 Low Yes   Processing Speed 111 77 Above Average Yes   Executive Function 85 16 Low Average Yes   Reasoning 102 55 Average Yes   Working Memory 112 79 Above Average Yes   Sustained Attention  106 66 Average Yes   Simple Attention 28 1 Very Low Yes   Motor Speed 104 61 Average Yes     Neurocognitive Index (NCI): Measures an average score derived from the domain scores or a general assessment of the overall neurocognitive status of the patient. The patient's NCI score is 91, with a percentile of 27, and falls within the average range.    Composite Memory: Measures how well subject can recognize, remember, and retrieve words and geometric figures, and is comprised of the Visual and Verbal Memory domains. The patient's Composite Memory score is 123, with a percentile of 94, and falls within the above average range.    Verbal Memory: Measures how well subject can recognize, remember, and retrieve words. The patient's Verbal Memory score is 116, with a percentile of 86, and falls within the above average  range.    Visual Memory: Measures how well subject can recognize, remember and retrieve geometric figures. The patient's Visual Memory score is 123, with a percentile of 94, and falls within the above average range.    Psychomotor Speed: Measures how well a subject perceives, attends, responds to complex visual-perceptual information and performs simple fine motor coordination, and is comprised of the Motor Speed and Processing Speed indexes. The patient's Psychomotor Speed score is 109, with a percentile of 73, and falls within the average range.    Reaction Time: Measures how quickly the subject can react, in milliseconds, to a simple and increasingly complex direction set. The patient's Reaction Time score is 108, with a percentile of 70, and falls within the average range.    Complex Attention: Measures the ability to track and respond to a variety of stimuli over lengthy periods of time and/or perform complex mental tasks requiring vigilance quickly and accurately. The patient's Complex Attention score is 43, with a percentile of 1, and falls within the very low range.    Cognitive Flexibility: Measures how well subject is able to adapt to rapidly changing and increasingly complex set of directions and/or to manipulate the information. The patient's Cognitive Flexibility score is 73, with a percentile of 4, and falls within the low range.    Processing Speed: Measures how well a subject recognizes and processes information i.e., perceiving, attending/responding to incoming information, motor speed, fine motor coordination, and visual-perceptual ability. The patient's Processing Speed score is 111, with a percentile of 77, and falls within the above average range.    Executive Function: Measures how well a subject recognizes rules, categories, and manages or navigates rapid decision making. The patient's Executive Function score is 85, with a percentile of 16, and falls within the low average  range.    Reasoning: Measures how well a subject can perceive and understand the meaning of visual or abstract information and recognizing relationships between visual-abstract concepts. The patient's Reasoning score is 102, with a percentile of 55, and falls in the average range.     Working Memory: Measures how well a subject can perceive and attend to symbols using short-term memory processes. Also measures the ability to carry out short-term memory tasks that support decision making, problem solving, planning, and execution. The patient's Working Memory score is 112, with a percentile of 79, and falls in the above average range.    Sustained Attention: Measures how well a subject can direct and focus cognitive activity on specific stimuli. Also measurs how well a subject can focus and complete task or activity, sequence action, and focus during complex thought. The patient's Sustained Attention score is 106, with a percentile of 66, and falls in the average range.    Simple Attention: Measures the ability to track and respond to a single defined stimulus over lengthy periods of time while performing vigilance and response inhibition quickly and accurately to a simple task. The patient's Simple Attention score is 28, with a percentile of 1, and falls within the very low range.    Motor Speed: Measure: Ability to perform simple movements to produce and satisfy an intention towards a manual action and goal. The patient's Motor Speed score is 104, with a percentile of 61, and falls within the average range.    Erin Adult ADHD Rating Scale-IV: Self and Other Reports (BAARS-IV)  The BAARS-IV assesses for symptoms of ADHD that are experienced in one's daily life. This assessment measure includes self and collateral rating scales designed to provide information regarding current and childhood symptoms of ADHD including inattention, hyperactivity, and impulsivity. Self-report scores are reported as percentiles. Scores  "at the 76th-83rd percentile are considered marginal, scores at the 84th-92nd percentile are considered borderline, scores at the 93rd-95th percentile are considered mild, scores at the 96th-98th percentile are considered moderate, and those at the 99th percentile are considered severe. Collateral or \"other\" rating scales are reported as number of symptoms observed in comparison to those reported by the client. Norms and percentile scores are not available for collateral reports.     Current Symptoms Scale--Self Report:   Client completed the self-report inventory of current symptoms. The results indicate that the client's Total ADHD Score was 57 which places the patient in the 99+ percentile for overall ADHD symptoms. In addition, the client endorsed 9/9 (99+ percentile) Inattention symptoms, 7/9 (98th percentile) Hyperactivity-Impulsivity symptoms, and 7/9 (97th percentile) Sluggish Cognitive Tempo symptoms. Client indicated that the reported symptoms have resulted in impaired functioning in school, home, work, and social relationships. Overall, the results suggest the client is experiencing severe ADHD symptoms.     Current Symptoms Scale--Other Report:  Client's  completed the collateral report inventory of current symptoms. Based on the collateral contact's observation of symptoms, the client demonstrates 8/9 Inattention symptoms, 1/5 Hyperactivity symptoms, 4/4 Impulsivity symptoms, and 6/9 Sluggish Cognitive Tempo symptoms. The client's Total ADHD Score was 46. The collateral contact indicated the client demonstrates impaired functioning in school, home, work, and social relationships.  The collateral- and self-report scores are not significantly different.    Childhood Symptoms Scale--Self-Report:  Client completed the self-report inventory of childhood symptoms. The results indicate that the client's Total ADHD Score was 56 which places the patient in the 98th percentile for overall ADHD symptoms in " "childhood. In addition, the client endorsed 5/9 (92nd percentile) Inattention symptoms and 9/9 (99+ percentile) Hyperactivity-Impulsivity symptoms. Client indicated that the reported symptoms resulted in impaired functioning in school and at home.  Overall, the results suggest the client experienced moderate symptoms of ADHD as a child.     Childhood Symptoms Scale--Other Report:  Client's mother completed the collateral report inventory of childhood symptoms. Based on the collateral contact's recollection of client's childhood symptoms, the client demonstrated 0/9 Inattention symptoms and 7/9 Hyperactivity-Impulsivity symptoms. The client's Total ADHD Score was 45. The collateral contact indicated the client demonstrates impaired functioning in school and social relationships. The collateral- and self-report scores are significantly different.                           Erin Functional Impairment Scale: Self and Other Reports (BFIS)  The BFIS is used to assess an individuals' psychosocial impairment in major life/daily activities that may be due to a mental health disorder. This assessment measure includes self and collateral rating scales. Self-report scores are reported as percentiles. Scores at the 76th-83rd percentile are considered marginal, scores at the 84th-92nd percentile are considered borderline, scores at the 93rd-95th percentile are considered mild, scores at the 96th-98th percentile are considered moderate, and those at the 99th percentile are considered severe. Collateral or \"other\" rating scales are reported as number of symptoms observed in comparison to those reported by the client. Norms and percentile scores are not available for collateral reports.     Results indicate the client identified impairment (scores at or greater than 93rd percentile) in the following areas: home-family, home-chores, work, and daily responsibilities.  The client's Mean Impairment Score was 4.1 (76-84th percentile) " "indicating the client is reporting 31% impairment in functioning across domains. Client's  completed the collateral rating scale, which indicated similar results.      Erin Deficits in Executive Functioning Scale (BDEFS)  The BDEFS is a measure used for evaluating dimensions of adult executive functioning in daily life. This assessment measure includes self and collateral rating scales. Self-report scores are reported as percentiles. Scores at the 76th-83rd percentile are considered marginal, scores at the 84th-92nd percentile are considered borderline, scores at the 93rd-95th percentile are considered mild, scores at the 96th-98th percentile are considered moderate, and those at the 99th percentile are considered severe. Collateral or \"other\" rating scales are reported as number of symptoms observed in comparison to those reported by the client. Norms and percentile scores are not available for collateral reports.     Results indicate the client's Total Executive Functioning Score was 261 (99+ percentile). The ADHD-Executive Functioning Index score was 31 (98th percentile). These scores suggest the client has moderate deficits in executive functioning. Results indicate the client identified significant deficits in the following areas: self-management to time (severe deficits), self-organization/problem-solving (moderate deficits), self-restraint (moderate deficits), self-motivation (moderate deficits) and self-regulation of emotions (moderate deficits). Client's  completed the collateral rating scale, which indicated similar results.     Generalized Anxiety Disorder Questionnaire (JYOTHI-7)  This questionnaire is designed to assess for anxiety in adults. Based on the score, the patient is experiencing moderate symptoms of anxiety. Client identified the following symptoms of anxiety: feeling on edge/nervous/anxious, difficulty controlling worry, worrying about many different things, trouble relaxing, " being restless, and becoming easily annoyed or irritable.    Patient Health Questionnaire- 9 (PHQ-9)   This questionnaire is designed to assess for depression in adults. Based on the score, the patient is experiencing no significant symptoms of depression. Client identified the following symptoms of depression: difficulty with sleep, feeling tired or having little energy, and poor concentration.    SUMMARY: Raquel Van is a -year-old White woman who completed psychological testing remotely/virtually due to the COVID-19 pandemic. Testing was requested to provide updated diagnostic clarification and necessary treatment recommendations.    Patient first completed a diagnostic interview in which mental health symptoms, ADHD symptoms, and background information was gathered. Patient self-reported nine symptoms of inattention, four symptoms of hyperactivity, and indicated that her abilities to function effectively at home and work are significantly impaired. Further, her self-reported symptoms on Erin measures of ADHD symptoms were consistent with this information. The patient recalled significant inattention and hyperactiviy in childhood and her mother recalled significant hyperactivity symptoms.     An objective measure of neurocognitive functioning indicated varied results.  First, the patient's verbal and visual memory capabilities were scored to be in the above average range.  Indeed, the patient was able to recall word lists and a series of shapes effectively both immediately and after a delay.  These capabilities present personal and normative strengths for the patient.  Her processing speed capabilities, measured by the time and accuracy that she was able to quickly match shapes to corresponding numbers was measured to be in the above average range, also representing a personal and normative strength for her.  The patient was able to solve nonverbal problems in the average range.  Deficits were measured on the  Stroop test, the shifting attention test, and the continuous performance test.  The patient had difficulty not impulsively responding to incorrect answers and attending to test stimuli.  As such, complex attention, cognitive flexibility, executive function, and simple attention were all measured to be normative weaknesses for the patient.  Problems in these areas are consistent with an ADHD diagnosis and represent a neurodevelopmental basis for current problems with inattention.  See recommendations below.    Referral Question Response: DSM-5 criteria for ADHD:   A. Symptom Count - Are there sufficient symptoms for the diagnosis? Yes, patient did endorse sufficient symptoms.   B. Onset - Were several symptoms present before 13 years of age? Yes, a significant number of symptoms reportedly began at age 7.  C. Pervasiveness - Are several symptoms present in at least two settings? Yes, patient reported that symptoms are problematic at home and work.   D. Impairment - Do symptoms interfere with or reduce the quality of functioning? Yes, patient is unable to complete daily and work tasks effectively.   E. Exclusions - Are symptoms better explained by another disorder or factor? No, symptoms are not better explained by anxiety symptoms. Difficulties are explained by an organic basis of inattention.     The patient meets the following DSM-5 criteria for generalized anxiety disorder:  A. Excessive anxiety and worry, occurring more days than not for at least 6 months about a number of events or activities.   B. The individual finds it difficult to control the worry.  C. The anxiety and worry are associated with 3 or more of 6 symptoms.  D. The anxiety, worry, or physical symptoms cause clinically significant distress or impairment in social, occupational, or other important areas of functioning.  E. The disturbance is not attributable to the physiological effects of a substance (e.g., a drug of abuse, a medication) or  another medical condition (e.g., hyperthyroidism).  F. The disturbance is not better explained by another mental disorder.    DIAGNOSES:  F90.0 Attention-Deficit/Hyperactivity Disorder, inattentive presentation, moderate  F41.1 Generalized Anxiety Disorder    PLAN OF CARE:  1. Discuss the following with your primary care provider:  a. Consider a trial of a stimulant medication. This may help alleviate some of the patient s attentional symptoms.   b. Consider a trial of a psychotropic medication. This may help alleviate some of the patient s anxiety symptoms.     2. Consider initiating individual psychotherapy to help alleviate mood symptoms. Research indicates that outcomes are best with both medication and therapy. You can call the [employer] [facility] line at [phone]. FAUSTO Saunders is recommended specifically because she works with individuals with ADHD.    RECOMMENDATIONS:  1. Due to the patient s reported attention, concentration, and mood difficulties, the following health/lifestyle changes when combined, can significantly improve symptoms:   a. Avoid simple carbohydrates at breakfast. Aim for only complex carbohydrates and lean protein for your morning meal.   b. Engage in aerobic exercise 3 times per week for 30 minutes, ensuring that your heart rate stays within your training zone. Further, reading the book,  Spark,  by Richmond Hill M.D can help the patient understand the benefits of exercise on the brain.   c. Research suggest that taking a high-quality multi-vitamin and antioxidant (1/2 cup of blueberries) daily in conjunction with balanced nutrition can be helpful.  d. Aim for the high end of daily water intake: around 72 ounces per day.  e. Ensure regular meals and snacks to maintain optimal attention.    2. The following may be beneficial in managing some of the patient s attention and concentration difficulties:  a. Due to the patient s difficulties with attention and  concentration, consider working in a completely distraction-free area while completing tasks. Workspaces should be completely clear except for the materials needed for the current task. Both visual and auditory distractions should be decreased as much as possible.  b. Considering decreased ability to focus and maintain attention, it is recommended that the patient take frequent breaks while completing tasks. This will help to maintain attention and effort. The patient may benefit from the use of a Mobitto Timer. The timer works by using built-in break times. After working on a task consistently for 25 minutes, the timer reminds the user to take a five-minute break before continuing, etc. A Mobitto timer can be downloaded as a free orestes to a phone or tablet.  c. Due to the patient s attentional and concentration symptoms, it is recommended to increase organization with the use of lists and calendars. Significantly increasing structure to the day and adhering to a set schedule can increase your ability to complete responsibilities, track deadline, etc. Breaking these tasks down into their component parts and recording them in a calendar/planner will likely be beneficial. Patient would benefit from setting feasible timelines for completion of activities. By establishing clear priorities for completing tasks, you can more likely complete the most important tasks first. The patient may also choose to elect to a friend or family member to help hold them accountable.    3. Avoid multitasking. Attempting to work on multiple tasks and projects the same increases the likelihood that an error will occur. Focus on one task at a time.    4. The patient may benefit from engaging in mindfulness practices. This may include breathing techniques, apps that provide guided meditation, or more interactive activities such as coloring.    5. See the attached tip sheet for more ideas as well as online and book resources.       Sobia KIMBLE  Mariely Hernandez LP  Clinical Psychologist   [email or web address]

## 2022-07-21 ENCOUNTER — TELEPHONE (OUTPATIENT)
Dept: BEHAVIORAL HEALTH | Facility: CLINIC | Age: 32
End: 2022-07-21

## 2022-07-21 NOTE — TELEPHONE ENCOUNTER
Writer spoke with pt and scheduled initial TC therapy appointment on 07/26/22 @  3:30 pm. Writer sent intake documents via [employer]. Writer will reply to referral source.Tracker completed.    Cecilia Godinez  07/21/22  252    ----- Message from Azra Kaye sent at 7/21/2022  1:58 PM CDT -----  Regarding: Bridge for therapy  Transition Clinic Referral   [place] Only   Limited [place] Availability    Type of Referral:    __x__Therapy Only: Does your patient require a same or next day appointment?:   ____Medication Only: Referral will automatically be declined if no next level of care scheduled. Suboxone and Opioid management referrals are automatically denied.  ____Therapy & Medication:  Referral will automatically be declined if no next level of care scheduled. Suboxone and Opioid management referrals are automatically denied.  ____Diagnostic Assessment     Suboxone and Opioid Management Referrals are Automatically Denied    TC Psychiatry cannot see patients who do not have active medical insurance    Referring Provider Name: Azra Kaye    Clinician completing the assessment.  Khanh Garcia    Referring Provider: TRIAGE & TRANSITION ([facility]) CLINICIAN     If known, referring provider contact name: Sobia Hernandez PsyD; Phone Number: [phone]  Service Line/Location: [facility]    Reason for Transition Clinic Referral: Coping strategies for ADHD & bridge of service - therapy    Next Level of Care Patient Will Be Transitioned To: December 1, 2022  Provider(s)Khanh Garcia  Location [facility] Psychiatry cannot see patients who do not have active medical insurance    What Would Be Helpful from the Transition Clinic: Coping strategies for ADHD & bridge of service - therapy     Needs: NO    Does Patient Have Access to Technology: Yes MyC    Patient E-mail Address: [email or web address]    Current Patient Phone Number: [phone];     Clinician Gender Preference (if  applicable): NO    Azra Kaye

## 2022-07-26 ENCOUNTER — VIRTUAL VISIT (OUTPATIENT)
Dept: BEHAVIORAL HEALTH | Facility: CLINIC | Age: 32
End: 2022-07-26

## 2022-07-26 DIAGNOSIS — F41.1 GENERALIZED ANXIETY DISORDER: ICD-10-CM

## 2022-07-26 DIAGNOSIS — F90.0 ATTENTION DEFICIT HYPERACTIVITY DISORDER (ADHD), PREDOMINANTLY INATTENTIVE TYPE: Primary | ICD-10-CM

## 2022-07-26 NOTE — PROGRESS NOTES
[facility]                                    Progress Note    Patient Name: Raquel Van  Date: 2022         Service Type: Individual      Session Start Time: 3:30pm  Session End Time: 4:12pm     Session Length: 42 mins    Session #: 1    Attendees: Client    Service Modality:  Video Visit:      Provider verified identity through the following two step process.  Patient provided:  Patient  and Patient address    Telemedicine Visit: The patient's condition can be safely assessed and treated via synchronous audio and visual telemedicine encounter.      Reason for Telemedicine Visit: Patient has requested telehealth visit    Originating Site (Patient Location): Patient's home    Distant Site (Provider Location): [facility] [place]    Consent:  The patient/guardian has verbally consented to: the potential risks and benefits of telemedicine (video visit) versus in person care; bill my insurance or make self-payment for services provided; and responsibility for payment of non-covered services.     Patient would like the video invitation sent by:  My Chart    Mode of Communication:  Video Conference via [employer]    As the provider I attest to compliance with applicable laws and regulations related to telemedicine.    DATA  Interactive Complexity: No  Crisis: No        Progress Since Last Session (Related to Symptoms / Goals / Homework):   Symptoms: n/a - initial session    Homework: n/a - initial session      Episode of Care Goals: Satisfactory progress - PREPARATION (Decided to change - considering how); Intervened by negotiating a change plan and determining options / strategies for behavior change, identifying triggers, exploring social supports, and working towards setting a date to begin behavior change     Current / Ongoing Stressors and Concerns:   Pt had DA and ADHD testing completed by  assessment center on 2022 and 2022  and is being referred for bridging services until pt can establish care with outpatient therapist as scheduled with [facility] on 12/1/2022. 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 Pt reports she was seeking out the ADHD diagnosis, as she had always struggled with inattentiveness since she was a kid, but the JYOTHI diagnosis was a surprise to her. Pt reports it has been difficult to come to terms that she was not getting the support she needed as a child, because she was not a disruptive student and always had good grades because she was interested in academics. She reports her mother and sister also likely struggle with ADHD. Currently, pt's concern is how her ADHD symptoms are impacting her work life, as she struggles with an 8 hour work day, productivity, procrastinating, staying on task, and also worry about social interactions. Pt reports she really enjoys her work but gets bored easily. She reports her supervisors are supportive but she is unsure if she should disclose her new mental health diagnosis to them and discuss if accommodations are needed or not. Pt reports her  is also a supportive person in her life. Pt denied any other major concerns at this time, she denied any SI/SIB/HI, hallucinations, katie, or safety issues. Pt reports she has an appointment with her PCP in September to discuss medications options for ADHD and possibly JYOTHI. Pt expressed being open to therapy and wanting to make sure she is coping and managing her ADHD in healthy ways.     Treatment Objective(s) Addressed in This Session:   Identify and use 3 strategies to improve organization  Identify stressors which contribute to feelings of anxiety  Improve concentration, focus, and mindfulness in daily activities   Discuss motivation / ambivalence about a referral to specialty mental health services (Therapy, Day Tx,  PHP)     Intervention:   Motivational Interviewing: Build rapport, active listening, validation, reassurance, and psychoeducation   Solution Focused: discussed level of care/services needed, additional referrals, and coping strategies    Assessments completed prior to visit:  The following assessments were completed by patient for this visit:  PHQ9:   PHQ-9 SCORE 6/22/2022   PHQ-9 Total Score 5     GAD7:   JYOTHI-7 SCORE 6/22/2022 7/6/2022   Total Score 12 (moderate anxiety) 7 (mild anxiety)   Total Score 12 7         ASSESSMENT: Current Emotional / Mental Status (status of significant symptoms):   Risk status (Self / Other harm or suicidal ideation)   Patient denies current fears or concerns for personal safety.   Patient denies current or recent suicidal ideation or behaviors.   Patient denies current or recent homicidal ideation or behaviors.   Patient denies current or recent self injurious behavior or ideation.   Patient denies other safety concerns.   Patient reports there has been no change in risk factors since their last session.     Patient reports there has been no change in protective factors since their last session.     Recommended that patient call 911 or go to the local ED should there be a change in any of these risk factors.     Appearance:   Appropriate    Eye Contact:   Good    Psychomotor Behavior: Normal    Attitude:   Cooperative  Friendly Pleasant   Orientation:   All   Speech    Rate / Production: Normal/ Responsive Normal     Volume:  Normal    Mood:    Normal Euthymic   Affect:    Appropriate    Thought Content:  Clear    Thought Form:  Coherent  Goal Directed  Logical    Insight:    Good      Medication Review:   No current psychiatric medications prescribed     Medication Compliance:   NA     Changes in Health Issues:   None reported     Chemical Use Review:   Substance Use: Chemical use reviewed, no active concerns identified      Tobacco Use: No current tobacco use.      Diagnosis:  1.  Attention deficit hyperactivity disorder (ADHD), predominantly inattentive type    2. Generalized anxiety disorder        Collateral Reports Completed:   Chart Review    PLAN: (Patient Tasks / Therapist Tasks / Other)  Pt will continue to work on task completion at work and decide if she will discuss her new diagnosis with her supervisors. Pt will follow-up with the Transition Clinic in 2 weeks or every other week, as she does not feel weekly appointments are needed at this time. Pt expressed interest in seeing a therapist that specializes in ADHD sooner rather than later and was interested in having the TC coordinator contact her about other community options. Pt reports she would like to keep her [facility] appointment on 12/1/2022 for the time being, as that therapist was recommended to her.        Tai Ornelas, LICSW  7/26/2022

## 2022-07-27 ENCOUNTER — TELEPHONE (OUTPATIENT)
Dept: BEHAVIORAL HEALTH | Facility: CLINIC | Age: 32
End: 2022-07-27

## 2022-07-27 NOTE — TELEPHONE ENCOUNTER
Writer left pt a voicemail as requested by provider to schedule long term appointment on care connect with a provider that focuses on ADHD- Gin Curtis. Writer provided TC contact info for a call back to schedule.    Cecilia Godinez  07/27/22  850

## 2022-08-11 ENCOUNTER — VIRTUAL VISIT (OUTPATIENT)
Dept: BEHAVIORAL HEALTH | Facility: CLINIC | Age: 32
End: 2022-08-11
Payer: COMMERCIAL

## 2022-08-11 DIAGNOSIS — F41.1 GENERALIZED ANXIETY DISORDER: ICD-10-CM

## 2022-08-11 DIAGNOSIS — F90.0 ATTENTION DEFICIT HYPERACTIVITY DISORDER, INATTENTIVE TYPE, MODERATE: ICD-10-CM

## 2022-08-11 PROCEDURE — 90834 PSYTX W PT 45 MINUTES: CPT | Mod: GT | Performed by: SOCIAL WORKER

## 2022-08-11 NOTE — PROGRESS NOTES
[facility]   Mental Health & Addiction Services     Progress Note - Initial Visit    Patient  Name:  Raquel Van Date: 22         Service Type: Individual     Visit Start Time: 1300  Visit End Time: 1352    Visit #: 1                           Attendees: Client    Service Modality:  Video Visit:      Provider verified identity through the following two step process.  Patient provided:  Patient     Telemedicine Visit: The patient's condition can be safely assessed and treated via synchronous audio and visual telemedicine encounter.      Reason for Telemedicine Visit: Patient has requested telehealth visit    Originating Site (Patient Location): Patient's home    Distant Site (Provider Location): Provider Remote Setting- Home Office    Consent:  The patient/guardian has verbally consented to: the potential risks and benefits of telemedicine (video visit) versus in person care; bill my insurance or make self-payment for services provided; and responsibility for payment of non-covered services.     Patient would like the video invitation sent by:  Send to e-mail at: [email or web address]    Mode of Communication:  Video Conference via Amwell    As the provider I attest to compliance with applicable laws and regulations related to telemedicine.       DATA:   Interactive Complexity: No   Crisis: No     Presenting Concerns/  Current Stressors:  Pt had DA and ADHD testing completed by  assessment center on 2022 and 2022 and is being referred for bridging services until pt can establish care with outpatient therapist as scheduled with [facility] on 2022. 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 Pt reports she was seeking out the ADHD diagnosis, as she had always struggled with inattentiveness since she was a kid, but the JYOTHI diagnosis was a surprise to her. Pt reports it has been  difficult to come to terms that she was not getting the support she needed as a child, because she was not a disruptive student and always had good grades because she was interested in academics. She reports her mother and sister also likely struggle with ADHD. Currently, pt's concern is how her ADHD symptoms are impacting her work life, as she struggles with an 8 hour work day, productivity, procrastinating, staying on task, and also worry about social interactions. Pt reports she really enjoys her work but gets bored easily. She reports her supervisors are supportive but she is unsure if she should disclose her new mental health diagnosis to them and discuss if accommodations are needed or not. Pt reports her  is also a supportive person in her life. Pt denied any other major concerns at this time, she denied any SI/SIB/HI, hallucinations, katie, or safety issues. Pt reports she has an appointment with her PCP in September to discuss medications options for ADHD and possibly JYOTHI. Pt expressed being open to therapy and wanting to make sure she is coping and managing her ADHD in healthy ways.      ASSESSMENT:  Mental Status Assessment:  Appearance:   Appropriate   Eye Contact:   Good   Psychomotor Behavior: Normal   Attitude:   Cooperative   Orientation:   All  Speech   Rate / Production: Normal/ Responsive   Volume:  Normal   Mood:    Normal  Affect:    Appropriate   Thought Content:  Clear   Thought Form:  Coherent   Insight:    Good       Safety Issues and Plan for Safety and Risk Management:     [place] Suicide Severity Rating Scale (Short Version)  [place] Suicide Severity Rating (Short Version) 5/5/2021   Over the past 2 weeks have you felt down, depressed, or hopeless? no   Over the past 2 weeks have you had thoughts of killing yourself? no   Have you ever attempted to kill yourself? no     Patient denies current fears or concerns for personal safety.  Patient denies current or recent suicidal  ideation or behaviors.  Patient denies current or recent homicidal ideation or behaviors.  Patient denies current or recent self injurious behavior or ideation.  Patient denies other safety concerns.  Recommended that patient call 911 or go to the local ED should there be a change in any of these risk factors.  Patient reports there are no firearms in the house.     Diagnostic Criteria:  A. Symptom Count - Are there sufficient symptoms for the diagnosis? Yes, patient did endorse sufficient symptoms.   B. Onset - Were several symptoms present before 13 years of age? Yes, a significant number of symptoms reportedly began in elementary school.   C. Pervasiveness - Are several symptoms present in at least two settings? Yes, patient reported that symptoms are problematic at home and work.   D. Impairment - Do symptoms interfere with or reduce the quality of functioning? Yes, patient is unable to complete daily and work tasks effectively.   E. Exclusions - Are symptoms better explained by another disorder or factor? No, symptoms are not better explained by anxiety and depression symptoms. Difficulties are explained by an organic basis of inattention.      DSM5 Diagnoses: (Sustained by DSM5 Criteria Listed Above)  Diagnoses: Attention-Deficit/Hyperactivity Disorder  314.00 (F90.0) Predominantly inattentive presentation   Generalized anxiety disorder  Psychosocial & Contextual Factors: Work stressors and adjustment to ADHD diagnoses.  WHODAS 2.0 (12 item):   WHODAS 2.0 Total Score 6/22/2022   Total Score 28   Total Score MyChart 28     JYOTHI-7 SCORE 6/22/2022 7/6/2022   Total Score 12 (moderate anxiety) 7 (mild anxiety)   Total Score 12 7     PHQ 6/22/2022   PHQ-9 Total Score 5   Q9: Thoughts of better off dead/self-harm past 2 weeks Not at all     Intervention:  Stop thought processing techniques, exploring self-concept and education and management of ADHD symptoms  Collateral Reports Completed:  Routed note to  PCP      PLAN: (Homework, other):  1. Provider will continue with completing treatment plan.  Patient was given the following to do until next session:       will email information on comorbidity podcast and medication tracking form.    2. Provider recommended the following referrals:      3.  Suicide Risk and Safety Concerns were assessed for Raquel Van.      Tasneem TERRY August 11, 2022

## 2022-09-15 ENCOUNTER — OFFICE VISIT (OUTPATIENT)
Dept: FAMILY MEDICINE | Facility: CLINIC | Age: 32
End: 2022-09-15
Payer: COMMERCIAL

## 2022-09-15 VITALS
SYSTOLIC BLOOD PRESSURE: 130 MMHG | WEIGHT: 241 LBS | HEART RATE: 80 BPM | DIASTOLIC BLOOD PRESSURE: 80 MMHG | OXYGEN SATURATION: 97 % | HEIGHT: 67 IN | BODY MASS INDEX: 37.83 KG/M2 | TEMPERATURE: 98.4 F

## 2022-09-15 DIAGNOSIS — F90.0 ATTENTION DEFICIT HYPERACTIVITY DISORDER (ADHD), PREDOMINANTLY INATTENTIVE TYPE: ICD-10-CM

## 2022-09-15 DIAGNOSIS — E66.01 MORBID OBESITY (H): ICD-10-CM

## 2022-09-15 DIAGNOSIS — R06.83 SNORING: ICD-10-CM

## 2022-09-15 DIAGNOSIS — K21.9 GASTROESOPHAGEAL REFLUX DISEASE WITHOUT ESOPHAGITIS: ICD-10-CM

## 2022-09-15 DIAGNOSIS — Z76.89 ENCOUNTER TO ESTABLISH CARE: Primary | ICD-10-CM

## 2022-09-15 PROBLEM — F90.9 ADHD (ATTENTION DEFICIT HYPERACTIVITY DISORDER): Status: ACTIVE | Noted: 2022-09-15

## 2022-09-15 PROCEDURE — 99214 OFFICE O/P EST MOD 30 MIN: CPT | Performed by: NURSE PRACTITIONER

## 2022-09-15 RX ORDER — LISDEXAMFETAMINE DIMESYLATE 30 MG/1
30 CAPSULE ORAL EVERY MORNING
Qty: 30 CAPSULE | Refills: 0 | Status: SHIPPED | OUTPATIENT
Start: 2022-09-15 | End: 2022-09-23

## 2022-09-15 RX ORDER — ESOMEPRAZOLE MAGNESIUM 40 MG/1
40 CAPSULE, DELAYED RELEASE ORAL
Qty: 90 CAPSULE | Refills: 3 | Status: SHIPPED | OUTPATIENT
Start: 2022-09-15 | End: 2023-09-25

## 2022-09-15 NOTE — PROGRESS NOTES
"  Assessment & Plan     Encounter to establish care    Attention deficit hyperactivity disorder (ADHD), predominantly inattentive type  Discussed treatment of ADHD.  No contraindication to stimulants.  Start Vyvanse 30 mg daily.  Discussed proper use and possible side effects.  Will need to monitor sleep, appetite, and anxiety.  Follow up in 4 weeks.   - lisdexamfetamine (VYVANSE) 30 MG capsule  Dispense: 30 capsule; Refill: 0    Gastroesophageal reflux disease without esophagitis  - esomeprazole (NEXIUM) 40 MG DR capsule  Dispense: 90 capsule; Refill: 3    Snoring  - Adult Sleep Eval & Management  Referral    Morbid obesity (H)         BMI:   Estimated body mass index is 37.75 kg/m  as calculated from the following:    Height as of this encounter: 1.702 m (5' 7\").    Weight as of this encounter: 109.3 kg (241 lb).       Return in about 4 weeks (around 10/13/2022).    Elida Ray NP  [facility]    Sandy García is a 32 year old who presents to establish care.  Patient is  without children.  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  Recently moved to the area.    History of GERD.  She was previously following with a gastroenterologist and had an EGD.  She does not get any heartburn or known reflux, but was having some upper abdominal pain.  Currently on Nexium daily, which completely controls her symptoms.    Patient has concerns about possible sleep apnea.  Her  has noted that she is snoring a lot more heavily.  She has woken up on occasion with coughing and gagging like she is out of breath.  This is not happening every night.   has not noticed any apnea.  She feels like she is not sleeping well, but does not wake frequently throughout the night.  She has some mild daytime fatigue/drowsiness.  She would be interested in a sleep study.    Patient recently diagnosed with ADHD, predominantly inattentive type.  She is primarily here " "today to discuss this.  She mostly struggles with staying on task and focusing.  She was also diagnosed with some anxiety, but does not feel like this is a huge issue.  She was referred to a therapist as well.  She is interested in starting treatment for ADHD.    Review of Systems   Pertinent items in HPI      Objective    /80   Pulse 80   Temp 98.4  F (36.9  C) (Oral)   Ht 1.702 m (5' 7\")   Wt 109.3 kg (241 lb)   SpO2 97%   BMI 37.75 kg/m    Body mass index is 37.75 kg/m .  Physical Exam   GENERAL: healthy, alert and no distress  NEURO: Normal strength and tone, mentation intact and speech normal  PSYCH: mentation appears normal, affect normal/bright      "

## 2022-09-17 ENCOUNTER — HEALTH MAINTENANCE LETTER (OUTPATIENT)
Age: 32
End: 2022-09-17

## 2022-09-23 ENCOUNTER — TELEPHONE (OUTPATIENT)
Dept: FAMILY MEDICINE | Facility: CLINIC | Age: 32
End: 2022-09-23

## 2022-09-23 DIAGNOSIS — F90.0 ATTENTION DEFICIT HYPERACTIVITY DISORDER (ADHD), PREDOMINANTLY INATTENTIVE TYPE: Primary | ICD-10-CM

## 2022-09-23 RX ORDER — DEXTROAMPHETAMINE SACCHARATE, AMPHETAMINE ASPARTATE MONOHYDRATE, DEXTROAMPHETAMINE SULFATE AND AMPHETAMINE SULFATE 2.5; 2.5; 2.5; 2.5 MG/1; MG/1; MG/1; MG/1
10 CAPSULE, EXTENDED RELEASE ORAL DAILY
Qty: 30 CAPSULE | Refills: 0 | Status: SHIPPED | OUTPATIENT
Start: 2022-09-23 | End: 2022-10-14

## 2022-09-23 NOTE — TELEPHONE ENCOUNTER
General Call      Reason for Call:  Prior Auth    What are your questions or concerns:  Calling for update on Vyvanse PA    Date of last appointment with provider: 9/15/2022    Could we send this information to you in [employer] or would you prefer to receive a phone call?:   Patient would prefer a phone call   Okay to leave a detailed message?: No at Other phone number:  [phone]

## 2022-09-23 NOTE — TELEPHONE ENCOUNTER
Central Prior Authorization Team - Phone: [phone]     PA Initiation    Medication: VYVANSE- PA INITIATED  Insurance Company:    Pharmacy Filling the Rx: [id number] [place] [address]  Filling Pharmacy Phone: [phone]  Filling Pharmacy Fax:    Start Date: 9/23/2022

## 2022-09-23 NOTE — TELEPHONE ENCOUNTER
Central Prior Authorization Team - Phone: [phone]     PRIOR AUTHORIZATION DENIED    Medication: VYVANSE- PA DENIED    Denial Date: 9/23/2022    Denial Rational:                 Appeal Information:If the provider would like to appeal, please provide a letter of medical necessity and route back to the team. Otherwise you can close the encounter. Thank you, Central PA Team

## 2022-09-29 ENCOUNTER — VIRTUAL VISIT (OUTPATIENT)
Dept: BEHAVIORAL HEALTH | Facility: CLINIC | Age: 32
End: 2022-09-29
Payer: COMMERCIAL

## 2022-09-29 DIAGNOSIS — F90.0 ATTENTION DEFICIT HYPERACTIVITY DISORDER, INATTENTIVE TYPE, MODERATE: Primary | ICD-10-CM

## 2022-09-29 PROCEDURE — 90834 PSYTX W PT 45 MINUTES: CPT | Mod: GT | Performed by: SOCIAL WORKER

## 2022-09-29 NOTE — PROGRESS NOTES
[facility]                                     Progress Note    Patient Name: Raquel Van  Date: 9/29/22         Service Type: Individual      Session Start Time: 1500  Session End Time: 1552     Session Length: 52    Session #: 2    Attendees: Client    Service Modality:  Video Visit:      Provider verified identity through the following two step process.  Patient provided:  Patient is known previously to provider    Telemedicine Visit: The patient's condition can be safely assessed and treated via synchronous audio and visual telemedicine encounter.      Reason for Telemedicine Visit: Patient has requested telehealth visit    Originating Site (Patient Location): Patient's home    Distant Site (Provider Location): Provider Remote Setting- Home Office    Consent:  The patient/guardian has verbally consented to: the potential risks and benefits of telemedicine (video visit) versus in person care; bill my insurance or make self-payment for services provided; and responsibility for payment of non-covered services.     Patient would like the video invitation sent by:  Send to e-mail at: [email or web address]    Mode of Communication:  Video Conference via [employer]well    As the provider I attest to compliance with applicable laws and regulations related to telemedicine.    DATA  Interactive Complexity: No  Crisis: No        Progress Since Last Session (Related to Symptoms / Goals / Homework):   Symptoms: Patient indicated that she is now on 10 mg of Adderall.  She has noticed a difference in feeling Colmer and more able to verbalize thoughts in the less intense and rushed manner.    Homework: We will be utilizing the ADHD medication log to track progress of medication and side effects.      Episode of Care Goals: Satisfactory progress - PREPARATION (Decided to change - considering how); Intervened by negotiating a change plan and determining options / strategies for behavior change, identifying triggers,  exploring social supports, and working towards setting a date to begin behavior change     Current / Ongoing Stressors and Concerns:   Is planning on going out of town for work.  Feeling somewhat overwhelmed thinking of everything she has to do prior to her trip.     Treatment Objective(s) Addressed in This Session:   Patient indicated that she is attempting to read about ADHD and those techniques and strategies to manage symptoms.  Patient is feeling positive optimistic regarding her future.       Intervention:   Stop thought processing techniques, exploring self-concept and education and management of ADHD symptoms    Assessments completed prior to visit:  The following assessments were completed by patient for this visit:  PHQ9:   PHQ-9 SCORE 6/22/2022   PHQ-9 Total Score 5     GAD7:   JYOTHI-7 SCORE 6/22/2022 7/6/2022   Total Score 12 (moderate anxiety) 7 (mild anxiety)   Total Score 12 7     PROMIS 10-Global Health (all questions and answers displayed):   PROMIS 10 7/6/2022   In general, would you say your health is: Good   In general, would you say your quality of life is: Very good   In general, how would you rate your physical health? Good   In general, how would you rate your mental health, including your mood and your ability to think? Good   In general, how would you rate your satisfaction with your social activities and relationships? Very good   In general, please rate how well you carry out your usual social activities and roles Fair   To what extent are you able to carry out your everyday physical activities such as walking, climbing stairs, carrying groceries, or moving a chair? Completely   How often have you been bothered by emotional problems such as feeling anxious, depressed or irritable? Sometimes   How would you rate your fatigue on average? Mild   How would you rate your pain on average?   0 = No Pain  to  10 = Worst Imaginable Pain 1   In general, would you say your health is: 3   In general,  would you say your quality of life is: 4   In general, how would you rate your physical health? 3   In general, how would you rate your mental health, including your mood and your ability to think? 3   In general, how would you rate your satisfaction with your social activities and relationships? 4   In general, please rate how well you carry out your usual social activities and roles. (This includes activities at home, at work and in your community, and responsibilities as a parent, child, spouse, employee, friend, etc.) 2   To what extent are you able to carry out your everyday physical activities such as walking, climbing stairs, carrying groceries, or moving a chair? 5   In the past 7 days, how often have you been bothered by emotional problems such as feeling anxious, depressed, or irritable? 3   In the past 7 days, how would you rate your fatigue on average? 2   In the past 7 days, how would you rate your pain on average, where 0 means no pain, and 10 means worst imaginable pain? 1   Global Mental Health Score 14   Global Physical Health Score 16   PROMIS TOTAL - SUBSCORES 30   Some recent data might be hidden         ASSESSMENT: Current Emotional / Mental Status (status of significant symptoms):   Risk status (Self / Other harm or suicidal ideation)   Patient denies current fears or concerns for personal safety.   Patient denies current or recent suicidal ideation or behaviors.   Patient denies current or recent homicidal ideation or behaviors.   Patient denies current or recent self injurious behavior or ideation.   Patient denies other safety concerns.   Patient reports there has been no change in risk factors since their last session.     Patient reports there has been no change in protective factors since their last session.     Recommended that patient call 911 or go to the local ED should there be a change in any of these risk factors.     Appearance:   Appropriate    Eye Contact:   Good    Psychomotor  Behavior: Normal    Attitude:   Cooperative    Orientation:   All   Speech    Rate / Production: Normal     Volume:  Normal    Mood:    Normal   Affect:    Appropriate    Thought Content:  Clear    Thought Form:  Coherent  Logical    Insight:    Good      Medication Review:   No changes to current psychiatric medication(s)     Medication Compliance:   Yes     Changes in Health Issues:   None reported     Chemical Use Review:   Substance Use: Chemical use reviewed, no active concerns identified      Tobacco Use: No current tobacco use.      Diagnosis:  ADHD inattentive type    Collateral Reports Completed:   Routed note to PCP    PLAN: (Patient Tasks / Therapist Tasks / Other)  1. Provider will continue with completing treatment plan.  Patient was given the following to do until next session:       will email information on comorbidity podcast and medication tracking form.     2. Provider recommended the following referrals:       3.  Suicide Risk and Safety Concerns were assessed for Raquel Van.        Tasneem Garcia [facility]  9/29/22                                                         ______________________________________________________________________

## 2022-10-14 ENCOUNTER — OFFICE VISIT (OUTPATIENT)
Dept: FAMILY MEDICINE | Facility: CLINIC | Age: 32
End: 2022-10-14
Payer: COMMERCIAL

## 2022-10-14 VITALS
BODY MASS INDEX: 37.04 KG/M2 | SYSTOLIC BLOOD PRESSURE: 122 MMHG | WEIGHT: 236.5 LBS | DIASTOLIC BLOOD PRESSURE: 80 MMHG | HEART RATE: 82 BPM

## 2022-10-14 DIAGNOSIS — Z79.899 CONTROLLED SUBSTANCE AGREEMENT SIGNED: ICD-10-CM

## 2022-10-14 DIAGNOSIS — F90.0 ATTENTION DEFICIT HYPERACTIVITY DISORDER (ADHD), PREDOMINANTLY INATTENTIVE TYPE: Primary | ICD-10-CM

## 2022-10-14 PROCEDURE — 0124A COVID-19,PF,PFIZER BOOSTER BIVALENT: CPT | Performed by: NURSE PRACTITIONER

## 2022-10-14 PROCEDURE — 91312 COVID-19,PF,PFIZER BOOSTER BIVALENT: CPT | Performed by: NURSE PRACTITIONER

## 2022-10-14 PROCEDURE — 90686 IIV4 VACC NO PRSV 0.5 ML IM: CPT | Performed by: NURSE PRACTITIONER

## 2022-10-14 PROCEDURE — 99213 OFFICE O/P EST LOW 20 MIN: CPT | Mod: 25 | Performed by: NURSE PRACTITIONER

## 2022-10-14 PROCEDURE — 90471 IMMUNIZATION ADMIN: CPT | Performed by: NURSE PRACTITIONER

## 2022-10-14 RX ORDER — DEXTROAMPHETAMINE SACCHARATE, AMPHETAMINE ASPARTATE MONOHYDRATE, DEXTROAMPHETAMINE SULFATE AND AMPHETAMINE SULFATE 5; 5; 5; 5 MG/1; MG/1; MG/1; MG/1
20 CAPSULE, EXTENDED RELEASE ORAL DAILY
Qty: 30 CAPSULE | Refills: 0 | Status: SHIPPED | OUTPATIENT
Start: 2022-10-14 | End: 2022-11-25

## 2022-10-14 NOTE — PROGRESS NOTES
"  Assessment & Plan     Attention deficit hyperactivity disorder (ADHD), predominantly inattentive type  Increase dose to 20 mg daily.  Discussed that we may need to even advance the dose further.  CSA was discussed and signed today.  I asked that patient message me in the next month or so to let me know how the increased dose is going.  - amphetamine-dextroamphetamine (ADDERALL XR) 20 MG 24 hr capsule  Dispense: 30 capsule; Refill: 0    Controlled substance agreement signed         BMI:   Estimated body mass index is 37.04 kg/m  as calculated from the following:    Height as of 9/15/22: 1.702 m (5' 7\").    Weight as of this encounter: 107.3 kg (236 lb 8 oz).       Return in about 6 months (around 4/14/2023) for Follow up.    Elida Ray NP  [facility]    Sandy García is a 32 year old who presents for follow-up regarding ADHD.  Patient was started on Adderall XR about 1 month ago.  We had initially prescribed Vyvanse, but her insurance did not cover this.  Patient is tolerating the Adderall well.  She has noticed a decreased appetite and per our records has lost about 5 pounds.  This has not been a huge issue for her.  Sleep has been normal.  She has found that she actually feels less anxious.  She has had a stressful month and a lot of deadlines, which she tends to do very well with.  She is interested to see how her work will be affected by the Adderall in the next month.  Still has some \"brain fog\".  Would like to try and advance the dose.    Review of Systems   Pertinent items in HPI      Objective    /80   Pulse 82   Wt 107.3 kg (236 lb 8 oz)   BMI 37.04 kg/m    Body mass index is 37.04 kg/m .  Physical Exam   GENERAL: healthy, alert and no distress  RESP: lungs clear to auscultation - no rales, rhonchi or wheezes  CV: regular rate and rhythm, normal S1 S2, no S3 or S4, no murmur, click or rub, no peripheral edema  NEURO: Normal strength and tone, mentation " Routing refill request to provider for review/approval because:  Labs out of range:  BP, Creatinine  Labs not current:  FLP  Medication is reported/historical (Mirapex)    Sabrina Haywood RN  [facility]       intact and speech normal  PSYCH: mentation appears normal, affect normal/bright

## 2022-10-14 NOTE — LETTER
[facility]  10/14/22  Patient: Raquel Van  YOB: 1990  Medical Record Number: [id number]                                                                                  Non-Opioid Controlled Substance Agreement    This is an agreement between you and your provider regarding safe and appropriate use of controlled substances prescribed by your care team. Controlled substances are?medicines that can cause physical and mental dependence (abuse).     There are strict laws about having and using these medicines. We here at [facility] are  committed to working with you in your efforts to get better. To support you in this work, we'll help you schedule regular office appointments for medicine refills. If we must cancel or change your appointment for any reason, we'll make sure you have enough medicine to last until your next appointment.     As a Provider, I will:     Listen carefully to your concerns while treating you with respect.     Recommend a treatment plan that I believe is in your best interest and may involve therapies other than medicine.      Talk with you often about the possible benefits and the risk of harm of any medicine that we prescribe for you.    Assess the safety of this medicine and check how well it works.      Provide a plan on how to taper (discontinue or go off) using this medicine if the decision is made to stop its use.      ::  As a Patient, I understand controlled substances:       Are prescribed by my care provider to help me function or work and manage my condition(s).?    Are strong medicines and can cause serious side effects.       Need to be taken exactly as prescribed.?Combining controlled substances with certain medicines or chemicals (such as illegal drugs, alcohol, sedatives, sleeping pills, and benzodiazepines) can be dangerous or even fatal.? If I stop taking my medicines suddenly, I may have severe withdrawal symptoms.     The risks,  benefits, and side effects of these medicine(s) were explained to me. I agree that:    1. I will take part in other treatments as advised by my care team. This may be psychiatry or counseling, physical therapy, behavioral therapy, group treatment or a referral to specialist.    2. I will keep all my appointments and understand this is part of the monitoring of controlled substances.?My care team may require an office visit for EVERY controlled substance refill. If I miss appointments or don t follow instructions, my care team may stop my medicine    3. I will take my medicines as prescribed. I will not change the dose or schedule unless my care team tells me to. There will be no refills if I run out early.      4. I may be asked to come to the clinic and complete a urine drug test or complete a pill count. If I don t give a urine sample or participate in a pill count, the care team may stop my medicine.    5. I will only receive controlled substance prescriptions from this clinic. If I am treated by another provider, I will tell them that I am taking controlled substances and that I have a treatment agreement with this provider. I will inform my [facility] care team within one business day if I am given a prescription for any controlled substance by another healthcare provider. My [facility] care team can contact other providers and pharmacists about my use of any medicines.    6. It is up to me to make sure that I don't run out of my medicines on weekends or holidays.?If my care team is willing to refill my prescription without a visit, I must request refills only during office hours. Refills may take up to 3 business days to process. I will use one pharmacy to fill all my controlled substance prescriptions. I will notify the clinic about any changes to my insurance or medicine availability.    7. I am responsible for my prescriptions. If the medicine/prescription is lost, stolen or destroyed, it will  not be replaced.?I also agree not to share controlled substance medicines with anyone.     8. I am aware I should not use any illegal or recreational drugs. I agree not to drink alcohol unless my care team says I can.     9. If I enroll in the [place] Medical Cannabis program, I will tell my care team before my next refill.    10. I will tell my care team right away if I become pregnant, have a new medical problem treated outside of my regular clinic, or have a change in my medicines.     11. I understand that this medicine can affect my thinking, judgment and reaction time.? Alcohol and drugs affect the brain and body, which can affect the safety of my driving. Being under the influence of alcohol or drugs can affect my decision-making, behaviors, personal safety and the safety of others. Driving while impaired (DWI) can occur if a person is driving, operating or in physical control of a car, motorcycle, boat, snowmobile, ATV, motorbike, off-road vehicle or any other motor vehicle ([place] Statute 169A.20). I understand the risk if I choose to drive or operate any vehicle or machinery.    I understand that if I do not follow any of the conditions above, my prescriptions or treatment may be stopped or changed.   I agree that my provider, clinic care team and pharmacy may work with any city, state or federal law enforcement agency that investigates the misuse, sale or other diversion of my controlled medicine. I will allow my provider to discuss my care with, or share a copy of, this agreement with any other treating provider, pharmacy or emergency room where I receive care.     I have read this agreement and have asked questions about anything I did not understand.    ________________________________________________________  Patient Signature - Raquel E Wood     ___________________                   Date     ________________________________________________________  Provider Signature - Elida Ray NP        ___________________                   Date     ________________________________________________________  Witness Signature (required if provider not present while patient signing)          ___________________                   Date

## 2022-11-25 ENCOUNTER — MYC REFILL (OUTPATIENT)
Dept: FAMILY MEDICINE | Facility: CLINIC | Age: 32
End: 2022-11-25

## 2022-11-25 DIAGNOSIS — F90.0 ATTENTION DEFICIT HYPERACTIVITY DISORDER (ADHD), PREDOMINANTLY INATTENTIVE TYPE: ICD-10-CM

## 2022-11-26 RX ORDER — DEXTROAMPHETAMINE SACCHARATE, AMPHETAMINE ASPARTATE MONOHYDRATE, DEXTROAMPHETAMINE SULFATE AND AMPHETAMINE SULFATE 5; 5; 5; 5 MG/1; MG/1; MG/1; MG/1
20 CAPSULE, EXTENDED RELEASE ORAL DAILY
Qty: 30 CAPSULE | Refills: 0 | Status: SHIPPED | OUTPATIENT
Start: 2022-11-26

## 2023-01-10 ENCOUNTER — MYC REFILL (OUTPATIENT)
Dept: FAMILY MEDICINE | Facility: CLINIC | Age: 33
End: 2023-01-10

## 2023-01-10 DIAGNOSIS — F90.0 ATTENTION DEFICIT HYPERACTIVITY DISORDER (ADHD), PREDOMINANTLY INATTENTIVE TYPE: ICD-10-CM

## 2023-01-10 RX ORDER — DEXTROAMPHETAMINE SACCHARATE, AMPHETAMINE ASPARTATE MONOHYDRATE, DEXTROAMPHETAMINE SULFATE AND AMPHETAMINE SULFATE 5; 5; 5; 5 MG/1; MG/1; MG/1; MG/1
20 CAPSULE, EXTENDED RELEASE ORAL DAILY
Qty: 30 CAPSULE | Refills: 0 | Status: SHIPPED | OUTPATIENT
Start: 2023-02-10 | End: 2023-03-12

## 2023-01-10 RX ORDER — DEXTROAMPHETAMINE SACCHARATE, AMPHETAMINE ASPARTATE MONOHYDRATE, DEXTROAMPHETAMINE SULFATE AND AMPHETAMINE SULFATE 5; 5; 5; 5 MG/1; MG/1; MG/1; MG/1
20 CAPSULE, EXTENDED RELEASE ORAL DAILY
Qty: 30 CAPSULE | Refills: 0 | Status: CANCELLED | OUTPATIENT
Start: 2023-01-10

## 2023-01-10 RX ORDER — DEXTROAMPHETAMINE SACCHARATE, AMPHETAMINE ASPARTATE MONOHYDRATE, DEXTROAMPHETAMINE SULFATE AND AMPHETAMINE SULFATE 5; 5; 5; 5 MG/1; MG/1; MG/1; MG/1
20 CAPSULE, EXTENDED RELEASE ORAL DAILY
Qty: 30 CAPSULE | Refills: 0 | Status: SHIPPED | OUTPATIENT
Start: 2023-01-10 | End: 2023-02-09

## 2023-01-10 RX ORDER — DEXTROAMPHETAMINE SACCHARATE, AMPHETAMINE ASPARTATE MONOHYDRATE, DEXTROAMPHETAMINE SULFATE AND AMPHETAMINE SULFATE 5; 5; 5; 5 MG/1; MG/1; MG/1; MG/1
20 CAPSULE, EXTENDED RELEASE ORAL DAILY
Qty: 30 CAPSULE | Refills: 0 | Status: SHIPPED | OUTPATIENT
Start: 2023-03-13 | End: 2023-04-12

## 2023-05-06 ENCOUNTER — HEALTH MAINTENANCE LETTER (OUTPATIENT)
Age: 33
End: 2023-05-06

## 2023-09-24 DIAGNOSIS — K21.9 GASTROESOPHAGEAL REFLUX DISEASE WITHOUT ESOPHAGITIS: ICD-10-CM

## 2023-09-25 RX ORDER — ESOMEPRAZOLE MAGNESIUM 40 MG/1
40 CAPSULE, DELAYED RELEASE ORAL
Qty: 90 CAPSULE | Refills: 0 | Status: SHIPPED | OUTPATIENT
Start: 2023-09-25 | End: 2024-01-09

## 2024-01-09 DIAGNOSIS — K21.9 GASTROESOPHAGEAL REFLUX DISEASE WITHOUT ESOPHAGITIS: ICD-10-CM

## 2024-01-09 RX ORDER — ESOMEPRAZOLE MAGNESIUM 40 MG/1
40 CAPSULE, DELAYED RELEASE ORAL
Qty: 30 CAPSULE | Refills: 0 | Status: SHIPPED | OUTPATIENT
Start: 2024-01-09 | End: 2024-01-31

## 2024-01-31 DIAGNOSIS — K21.9 GASTROESOPHAGEAL REFLUX DISEASE WITHOUT ESOPHAGITIS: ICD-10-CM

## 2024-01-31 RX ORDER — ESOMEPRAZOLE MAGNESIUM 40 MG/1
40 CAPSULE, DELAYED RELEASE ORAL
Qty: 90 CAPSULE | Refills: 0 | Status: SHIPPED | OUTPATIENT
Start: 2024-01-31

## 2024-07-13 ENCOUNTER — HEALTH MAINTENANCE LETTER (OUTPATIENT)
Age: 34
End: 2024-07-13

## 2025-07-19 ENCOUNTER — HEALTH MAINTENANCE LETTER (OUTPATIENT)
Age: 35
End: 2025-07-19

## (undated) RX ORDER — FENTANYL CITRATE 50 UG/ML
INJECTION, SOLUTION INTRAMUSCULAR; INTRAVENOUS
Status: DISPENSED
Start: 2021-05-05